# Patient Record
Sex: FEMALE | Race: WHITE | NOT HISPANIC OR LATINO | Employment: UNEMPLOYED | URBAN - METROPOLITAN AREA
[De-identification: names, ages, dates, MRNs, and addresses within clinical notes are randomized per-mention and may not be internally consistent; named-entity substitution may affect disease eponyms.]

---

## 2017-02-21 ENCOUNTER — TRANSFERRED RECORDS (OUTPATIENT)
Dept: HEALTH INFORMATION MANAGEMENT | Facility: CLINIC | Age: 52
End: 2017-02-21

## 2017-02-22 ENCOUNTER — TRANSFERRED RECORDS (OUTPATIENT)
Dept: HEALTH INFORMATION MANAGEMENT | Facility: CLINIC | Age: 52
End: 2017-02-22

## 2017-05-31 ENCOUNTER — HOSPITAL ENCOUNTER (EMERGENCY)
Facility: CLINIC | Age: 52
Discharge: HOME | End: 2017-05-31
Attending: FAMILY MEDICINE | Admitting: FAMILY MEDICINE
Payer: MEDICAID

## 2017-05-31 VITALS
RESPIRATION RATE: 16 BRPM | WEIGHT: 249 LBS | TEMPERATURE: 98.1 F | OXYGEN SATURATION: 100 % | HEART RATE: 83 BPM | SYSTOLIC BLOOD PRESSURE: 126 MMHG | DIASTOLIC BLOOD PRESSURE: 83 MMHG

## 2017-05-31 DIAGNOSIS — D50.8 OTHER IRON DEFICIENCY ANEMIA: ICD-10-CM

## 2017-05-31 LAB
ALBUMIN SERPL-MCNC: 3.7 G/DL (ref 3.4–5)
ALP SERPL-CCNC: 86 U/L (ref 40–150)
ALT SERPL W P-5'-P-CCNC: 15 U/L (ref 0–50)
ANION GAP SERPL CALCULATED.3IONS-SCNC: 8 MMOL/L (ref 3–14)
AST SERPL W P-5'-P-CCNC: 16 U/L (ref 0–45)
BASOPHILS # BLD AUTO: 0 10E9/L (ref 0–0.2)
BASOPHILS NFR BLD AUTO: 0.3 %
BILIRUB SERPL-MCNC: 0.2 MG/DL (ref 0.2–1.3)
BUN SERPL-MCNC: 19 MG/DL (ref 7–30)
CALCIUM SERPL-MCNC: 9 MG/DL (ref 8.5–10.1)
CHLORIDE SERPL-SCNC: 110 MMOL/L (ref 94–109)
CO2 SERPL-SCNC: 25 MMOL/L (ref 20–32)
CREAT SERPL-MCNC: 1.06 MG/DL (ref 0.52–1.04)
DIFFERENTIAL METHOD BLD: ABNORMAL
EOSINOPHIL # BLD AUTO: 0.6 10E9/L (ref 0–0.7)
EOSINOPHIL NFR BLD AUTO: 6.4 %
ERYTHROCYTE [DISTWIDTH] IN BLOOD BY AUTOMATED COUNT: 15.9 % (ref 10–15)
GFR SERPL CREATININE-BSD FRML MDRD: 54 ML/MIN/1.7M2
GLUCOSE SERPL-MCNC: 85 MG/DL (ref 70–99)
HCT VFR BLD AUTO: 27.9 % (ref 35–47)
HGB BLD-MCNC: 7.5 G/DL (ref 11.7–15.7)
IMM GRANULOCYTES # BLD: 0 10E9/L (ref 0–0.4)
IMM GRANULOCYTES NFR BLD: 0.2 %
LYMPHOCYTES # BLD AUTO: 2.6 10E9/L (ref 0.8–5.3)
LYMPHOCYTES NFR BLD AUTO: 26.9 %
MCH RBC QN AUTO: 19 PG (ref 26.5–33)
MCHC RBC AUTO-ENTMCNC: 26.9 G/DL (ref 31.5–36.5)
MCV RBC AUTO: 71 FL (ref 78–100)
MONOCYTES # BLD AUTO: 0.6 10E9/L (ref 0–1.3)
MONOCYTES NFR BLD AUTO: 5.9 %
NEUTROPHILS # BLD AUTO: 5.9 10E9/L (ref 1.6–8.3)
NEUTROPHILS NFR BLD AUTO: 60.3 %
NRBC # BLD AUTO: 0 10*3/UL
NRBC BLD AUTO-RTO: 0 /100
PLATELET # BLD AUTO: 332 10E9/L (ref 150–450)
POTASSIUM SERPL-SCNC: 4.1 MMOL/L (ref 3.4–5.3)
PROT SERPL-MCNC: 7.2 G/DL (ref 6.8–8.8)
RBC # BLD AUTO: 3.94 10E12/L (ref 3.8–5.2)
SODIUM SERPL-SCNC: 143 MMOL/L (ref 133–144)
WBC # BLD AUTO: 9.7 10E9/L (ref 4–11)

## 2017-05-31 PROCEDURE — 36415 COLL VENOUS BLD VENIPUNCTURE: CPT

## 2017-05-31 PROCEDURE — 85025 COMPLETE CBC W/AUTO DIFF WBC: CPT | Performed by: FAMILY MEDICINE

## 2017-05-31 PROCEDURE — 80053 COMPREHEN METABOLIC PANEL: CPT | Performed by: FAMILY MEDICINE

## 2017-05-31 PROCEDURE — 99283 EMERGENCY DEPT VISIT LOW MDM: CPT | Mod: Z6 | Performed by: FAMILY MEDICINE

## 2017-05-31 PROCEDURE — 99283 EMERGENCY DEPT VISIT LOW MDM: CPT

## 2017-05-31 RX ORDER — POLYETHYLENE GLYCOL 3350 17 G/17G
1 POWDER, FOR SOLUTION ORAL DAILY
COMMUNITY

## 2017-05-31 ASSESSMENT — ENCOUNTER SYMPTOMS
ABDOMINAL PAIN: 1
BLOOD IN STOOL: 1
FATIGUE: 1

## 2017-05-31 NOTE — ED AVS SNAPSHOT
[facility], Emergency Department    [address]    [facility] [address]    Phone:  [phone]    Fax:  [phone]                                       Tatiana Smith   MRN: [id number]    Department:  [facility], Emergency Department   Date of Visit:  5/31/2017           After Visit Summary Signature Page     I have received my discharge instructions, and my questions have been answered. I have discussed any challenges I see with this plan with the nurse or doctor.    ..........................................................................................................................................  Patient/Patient Representative Signature      ..........................................................................................................................................  Patient Representative Print Name and Relationship to Patient    ..................................................               ................................................  Date                                            Time    ..........................................................................................................................................  Reviewed by Signature/Title    ...................................................              ..............................................  Date                                                            Time

## 2017-05-31 NOTE — ED AVS SNAPSHOT
[facility], Emergency Department    [address]    [place] [address]    Phone:  [phone]    Fax:  [phone]                                       Tatiana Smith   MRN: [id number]    Department:  [facility], Emergency Department   Date of Visit:  5/31/2017           Patient Information     Date Of Birth          1965        Your diagnoses for this visit were:     Other iron deficiency anemia        You were seen by Wolfgang Hale MD.        Discharge Instructions       Follow your clinician next week.  There is no evidence for a gi bleed  Continue the prilosec.  It is likely the anemia is from the surgery and you are not absorbing iron  Your red cells are normal in number but very small- this is the picture of iron deficiency    24 Hour Appointment Hotline       To make an appointment at any [facility], call [phone] ([phone]). If you don't have a family doctor or clinic, we will help you find one. [place] clinics are conveniently located to serve the needs of you and your family.             Review of your medicines      Our records show that you are taking the medicines listed below. If these are incorrect, please call your family doctor or clinic.        Dose / Directions Last dose taken    FERROUS GLUCONATE PO   Dose:  324 mg        Take 324 mg by mouth   Refills:  0        NONFORMULARY        Bio-dentical progesterone/testosterone cream   Refills:  0        PANTOPRAZOLE SODIUM PO        Refills:  0        polyethylene glycol powder   Commonly known as:  MIRALAX/GLYCOLAX   Dose:  1 capful        Take 1 capful by mouth daily   Refills:  0        ZOMIG PO        Refills:  0                Procedures and tests performed during your visit     CBC with platelets differential    Comprehensive metabolic panel      Orders Needing Specimen Collection     None      Pending Results     No orders found from 5/29/2017 to 6/1/2017.            Pending Culture Results     No orders  "found from 2017 to 2017.            Pending Results Instructions     If you had any lab results that were not finalized at the time of your Discharge, you can call the ED Lab Result RN at [phone]. You will be contacted by this team for any positive Lab results or changes in treatment. The nurses are available 7 days a week from 10A to 6:30P.  You can leave a message 24 hours per day and they will return your call.        Thank you for choosing [facility]       Thank you for choosing [facility] for your care. Our goal is always to provide you with excellent care. Hearing back from our patients is one way we can continue to improve our services. Please take a few minutes to complete the written survey that you may receive in the mail after you visit with us. Thank you!        [email or web address] Information     [email or web address] lets you send messages to your doctor, view your test results, renew your prescriptions, schedule appointments and more. To sign up, go to [email or web address] . Click on \"Log in\" on the left side of the screen, which will take you to the Welcome page. Then click on \"Sign up Now\" on the right side of the page.     You will be asked to enter the access code listed below, as well as some personal information. Please follow the directions to create your username and password.     Your access code is: [email or web address]  Expires: 2017 11:04 PM     Your access code will  in 90 days. If you need help or a new code, please call your [facility] clinic or [phone].        Care EveryWhere ID     This is your Care EveryWhere ID. This could be used by other organizations to access your [facility] medical records  [id number]        After Visit Summary       This is your record. Keep this with you and show to your community pharmacist(s) and doctor(s) at your next visit.                  "

## 2017-06-01 NOTE — ED PROVIDER NOTES
History     Chief Complaint   Patient presents with     Anemia     sent to ED from [facility] clinic for Hgb 8.2->7.5 in the past week; suspected bleeding ulcer. Hx hospitalizations for GI bleed/low hemoglobin     HPI  Tatiana Smith is a 52 year old female with a history of Shaina-en-Y gastric bypass (1986), gastric ulcers and anemia who presents to the emergency department today with complaints of anemia. Patient was seen at the [facility] clinic today and was noted to have a low hemoglobin of 7.5 (down from 8.2 last week). Patient states she has noted increased fatigue, paler and shortness of breath with exertion over the past few weeks. She also reports noting black colored stool, however, she is on iron supplements. She reports intermittent abdominal pain and has been on Prilosec for the past 3-4 weeks. She states her last menstrual period was about 2 weeks ago and was normal. She denies any other obvious signs of bleeding. Patient reports a history of anemia in the past related to gastric ulcers. She was most recently hospitalized in February (~4 months ago) with a hemoglobin of 6.6 in [place], [place]. She reported having had a normal colonoscopy at that time and her anemia was thought to be related to ulcers. She has required transfusion in the past. Patient is also takes B12 sublingually, states she last had this about 6 months ago. She denies taking any other regular medications. She denies any excessive use of ibuprofen. She denies any alcohol or caffeine use. She is a nonsmoker.    She was sent to the ed for further evaluation. AT THE CLINIC NO RECTAL OR GUIAC DONE! According to the pt    I have reviewed the Medications, Allergies, Past Medical and Surgical History, and Social History in the [employer] system.    Past Medical History:   Diagnosis Date     Anemia      H/O gastric bypass 1986     Ulcer, gastric, acute        Past Surgical History:   Procedure Laterality Date     GI SURGERY         No family history on  file.    Social History   Substance Use Topics     Smoking status: Never Smoker     Smokeless tobacco: Not on file     Alcohol use No     No current facility-administered medications for this encounter.      Current Outpatient Prescriptions   Medication     FERROUS GLUCONATE PO     PANTOPRAZOLE SODIUM PO     polyethylene glycol (MIRALAX/GLYCOLAX) powder     ZOLMitriptan (ZOMIG PO)     NONFORMULARY      No Known Allergies    Review of Systems   Constitutional: Positive for fatigue. Negative for chills and fever.   HENT: Negative for congestion.    Respiratory: Positive for shortness of breath (with exertion).    Cardiovascular: Negative for chest pain, palpitations and leg swelling.   Gastrointestinal: Positive for abdominal pain (intermittent) and blood in stool (black colored stool).   Genitourinary: Negative for dysuria and vaginal bleeding.   Skin: Positive for pallor.   Allergic/Immunologic: Negative for immunocompromised state.   Neurological: Negative for dizziness and light-headedness.   Hematological: Negative.    All other systems reviewed and are negative.      Physical Exam   BP: 127/73  Pulse: 87  Temp: 98.7  F (37.1  C)  Resp: 16  Weight: 112.9 kg (249 lb)  SpO2: 97 %  Physical Exam   Constitutional: She is oriented to person, place, and time. She appears well-developed and well-nourished. No distress.   pale   HENT:   Head: Normocephalic and atraumatic.   Eyes: Pupils are equal, round, and reactive to light.   Neck: Neck supple.   Cardiovascular: Normal rate, regular rhythm, normal heart sounds and intact distal pulses.    No murmur heard.  Pulmonary/Chest: Breath sounds normal. No respiratory distress.   Abdominal: Soft. She exhibits no mass. There is no tenderness.   No hs aretha   Genitourinary:   Genitourinary Comments: Rectal exam shows no lesions- small hemorrhoids  Stool guiac is negative for blood   Neurological: She is alert and oriented to person, place, and time.   Skin: Skin is warm and dry.  She is not diaphoretic.   Nursing note and vitals reviewed.      ED Course     ED Course     Procedures   8:18 PM  The patient was seen and examined by Dr. Hale in Room ED19.             Labs Ordered and Resulted from Time of ED Arrival Up to the Time of Departure from the ED   CBC WITH PLATELETS DIFFERENTIAL - Abnormal; Notable for the following:        Result Value    Hemoglobin 7.5 (*)     Hematocrit 27.9 (*)     MCV 71 (*)     MCH 19.0 (*)     MCHC 26.9 (*)     RDW 15.9 (*)     All other components within normal limits   COMPREHENSIVE METABOLIC PANEL - Abnormal; Notable for the following:     Chloride 110 (*)     Creatinine 1.06 (*)     GFR Estimate 54 (*)     All other components within normal limits            Assessments & Plan (with Medical Decision Making)   Well tolerated microcytic anemia with hx of weight reduction surgery.  This is likely an iron def anemia. There is no evidence for acute bleeding!  Follow up needed.    I have reviewed the nursing notes.    I have reviewed the findings, diagnosis, plan and need for follow up with the patient.    Discharge Medication List as of 5/31/2017 11:05 PM          Final diagnoses:   Other iron deficiency anemia   IKailyn, am serving as a trained medical scribe to document services personally performed by Wolfgang Hale MD, based on the provider's statements to me.      Wolfgang FUENTES MD, was physically present and have reviewed and verified the accuracy of this note documented by Kailyn Freire.       5/31/2017   [facility] EMERGENCY DEPARTMENT     Wolfgang Hale MD  06/07/17 5034

## 2017-06-01 NOTE — DISCHARGE INSTRUCTIONS
Follow your clinician next week.  There is no evidence for a gi bleed  Continue the prilosec.  It is likely the anemia is from the surgery and you are not absorbing iron  Your red cells are normal in number but very small- this is the picture of iron deficiency

## 2017-06-07 ASSESSMENT — ENCOUNTER SYMPTOMS
SHORTNESS OF BREATH: 1
DYSURIA: 0
DIZZINESS: 0
HEMATOLOGIC/LYMPHATIC NEGATIVE: 1
CHILLS: 0
PALPITATIONS: 0
FEVER: 0
LIGHT-HEADEDNESS: 0

## 2017-06-28 ENCOUNTER — MEDICAL CORRESPONDENCE (OUTPATIENT)
Dept: HEALTH INFORMATION MANAGEMENT | Facility: CLINIC | Age: 52
End: 2017-06-28

## 2017-10-01 ENCOUNTER — HEALTH MAINTENANCE LETTER (OUTPATIENT)
Age: 52
End: 2017-10-01

## 2018-07-25 ENCOUNTER — TRANSFERRED RECORDS (OUTPATIENT)
Dept: HEALTH INFORMATION MANAGEMENT | Facility: CLINIC | Age: 53
End: 2018-07-25

## 2018-07-25 ENCOUNTER — MEDICAL CORRESPONDENCE (OUTPATIENT)
Dept: HEALTH INFORMATION MANAGEMENT | Facility: CLINIC | Age: 53
End: 2018-07-25

## 2018-07-27 ENCOUNTER — TELEPHONE (OUTPATIENT)
Dept: GASTROENTEROLOGY | Facility: CLINIC | Age: 53
End: 2018-07-27

## 2018-07-27 ENCOUNTER — TRANSFERRED RECORDS (OUTPATIENT)
Dept: HEALTH INFORMATION MANAGEMENT | Facility: CLINIC | Age: 53
End: 2018-07-27

## 2018-07-27 NOTE — TELEPHONE ENCOUNTER
Spoke to Melissa from [facility] medical records in regards to referral received. Informed melissa we need all pertinent medical records relating to issue of what the patient is being referred for. Gave fax number where records can be sent to.

## 2018-08-24 ENCOUNTER — TELEPHONE (OUTPATIENT)
Dept: GASTROENTEROLOGY | Facility: CLINIC | Age: 53
End: 2018-08-24

## 2018-08-24 NOTE — TELEPHONE ENCOUNTER
LVM for patient in regards to message received from call center. Asked patient to return phone call to clarify referral. Left call back number for patient to call back.

## 2018-08-24 NOTE — TELEPHONE ENCOUNTER
Health Call Center    Phone Message    May a detailed message be left on voicemail: yes    Reason for Call: Other: Pt calling to check on status of referral. She states she called last week and had been told the clinic needed more information on her dx before she could be seen. She states she had contacted her provider's office (Dr. Linda Sarkar at [facility]), and wanted to see if clinic had received this. Please f/u with pt if information received or not.     Action Taken: Message routed to:  [facility] Gastro Adult

## 2018-08-27 NOTE — TELEPHONE ENCOUNTER
[facility]    Phone Message    May a detailed message be left on voicemail: yes    Reason for Call: Other: A nurse from the [facility] is calling to find out the status of her referral to GI. The pt told her we were waiting on something but she hasn't rcvd any requests for info. She is frustrated as the referral cxame in about a month ago. Please call the clinic at [phone] to discuss with any nurse as to what is needed to proceed.      Action Taken: Message routed to:  [facility] brian gi

## 2018-08-28 ENCOUNTER — TELEPHONE (OUTPATIENT)
Dept: GASTROENTEROLOGY | Facility: CLINIC | Age: 53
End: 2018-08-28

## 2018-08-28 NOTE — TELEPHONE ENCOUNTER
LVM for patient in regards to scheduling appointment in GI clinic. Left call back number for patient to call and schedule appointment.

## 2018-11-02 ENCOUNTER — TELEPHONE (OUTPATIENT)
Dept: GASTROENTEROLOGY | Facility: CLINIC | Age: 53
End: 2018-11-02

## 2018-11-02 NOTE — TELEPHONE ENCOUNTER
FUTURE VISIT INFORMATION      FUTURE VISIT INFORMATION:    Date: 11/5/18     Time:     Location: [facility]  REFERRAL INFORMATION:    Referring provider:  Dr. Jean Carlos Sarkar    Referring providers clinic:  [facility]    Reason for visit/diagnosis: Gastric Ulcer - All records received

## 2018-11-05 ENCOUNTER — PRE VISIT (OUTPATIENT)
Dept: GASTROENTEROLOGY | Facility: CLINIC | Age: 53
End: 2018-11-05

## 2018-11-05 ENCOUNTER — OFFICE VISIT (OUTPATIENT)
Dept: GASTROENTEROLOGY | Facility: CLINIC | Age: 53
End: 2018-11-05
Payer: COMMERCIAL

## 2018-11-05 VITALS
BODY MASS INDEX: 35.7 KG/M2 | OXYGEN SATURATION: 96 % | HEIGHT: 71 IN | DIASTOLIC BLOOD PRESSURE: 78 MMHG | HEART RATE: 80 BPM | SYSTOLIC BLOOD PRESSURE: 130 MMHG | WEIGHT: 255 LBS

## 2018-11-05 DIAGNOSIS — Z98.84 S/P GASTRIC BYPASS: ICD-10-CM

## 2018-11-05 DIAGNOSIS — D50.0 IRON DEFICIENCY ANEMIA DUE TO CHRONIC BLOOD LOSS: ICD-10-CM

## 2018-11-05 DIAGNOSIS — D50.0 IRON DEFICIENCY ANEMIA DUE TO CHRONIC BLOOD LOSS: Primary | ICD-10-CM

## 2018-11-05 LAB
BASOPHILS # BLD AUTO: 0.1 10E9/L (ref 0–0.2)
BASOPHILS NFR BLD AUTO: 0.7 %
DIFFERENTIAL METHOD BLD: ABNORMAL
EOSINOPHIL # BLD AUTO: 0.5 10E9/L (ref 0–0.7)
EOSINOPHIL NFR BLD AUTO: 7.1 %
ERYTHROCYTE [DISTWIDTH] IN BLOOD BY AUTOMATED COUNT: 16.6 % (ref 10–15)
FERRITIN SERPL-MCNC: 4 NG/ML (ref 8–252)
FOLATE SERPL-MCNC: 35.3 NG/ML
HCT VFR BLD AUTO: 31.7 % (ref 35–47)
HGB BLD-MCNC: 8.3 G/DL (ref 11.7–15.7)
IMM GRANULOCYTES # BLD: 0 10E9/L (ref 0–0.4)
IMM GRANULOCYTES NFR BLD: 0.3 %
IRON SATN MFR SERPL: 2 % (ref 15–46)
IRON SERPL-MCNC: 12 UG/DL (ref 35–180)
LYMPHOCYTES # BLD AUTO: 1.3 10E9/L (ref 0.8–5.3)
LYMPHOCYTES NFR BLD AUTO: 17.5 %
MCH RBC QN AUTO: 18.6 PG (ref 26.5–33)
MCHC RBC AUTO-ENTMCNC: 26.2 G/DL (ref 31.5–36.5)
MCV RBC AUTO: 71 FL (ref 78–100)
MONOCYTES # BLD AUTO: 0.5 10E9/L (ref 0–1.3)
MONOCYTES NFR BLD AUTO: 7.2 %
NEUTROPHILS # BLD AUTO: 4.8 10E9/L (ref 1.6–8.3)
NEUTROPHILS NFR BLD AUTO: 67.2 %
NRBC # BLD AUTO: 0 10*3/UL
NRBC BLD AUTO-RTO: 0 /100
PLATELET # BLD AUTO: 302 10E9/L (ref 150–450)
RBC # BLD AUTO: 4.46 10E12/L (ref 3.8–5.2)
RETICS # AUTO: 63.8 10E9/L (ref 25–95)
RETICS/RBC NFR AUTO: 1.4 % (ref 0.5–2)
TIBC SERPL-MCNC: 480 UG/DL (ref 240–430)
VIT B12 SERPL-MCNC: 1033 PG/ML (ref 193–986)
WBC # BLD AUTO: 7.2 10E9/L (ref 4–11)

## 2018-11-05 PROCEDURE — 83516 IMMUNOASSAY NONANTIBODY: CPT | Performed by: INTERNAL MEDICINE

## 2018-11-05 PROCEDURE — 82746 ASSAY OF FOLIC ACID SERUM: CPT | Performed by: INTERNAL MEDICINE

## 2018-11-05 RX ORDER — PANTOPRAZOLE SODIUM 40 MG/1
40 TABLET, DELAYED RELEASE ORAL DAILY
Qty: 60 TABLET | Refills: 1 | Status: SHIPPED | OUTPATIENT
Start: 2018-11-05 | End: 2019-02-25

## 2018-11-05 ASSESSMENT — ENCOUNTER SYMPTOMS
SWOLLEN GLANDS: 0
INCREASED ENERGY: 1
ABDOMINAL PAIN: 0
DIARRHEA: 0
TASTE DISTURBANCE: 0
ALTERED TEMPERATURE REGULATION: 1
POSTURAL DYSPNEA: 0
CHILLS: 0
EYE REDNESS: 0
EYE WATERING: 0
BACK PAIN: 0
POLYPHAGIA: 0
SNORES LOUDLY: 0
BLOOD IN STOOL: 1
WEIGHT GAIN: 0
BLOATING: 1
WEIGHT LOSS: 0
SHORTNESS OF BREATH: 0
HOARSE VOICE: 0
HALLUCINATIONS: 0
TROUBLE SWALLOWING: 1
POLYDIPSIA: 0
BRUISES/BLEEDS EASILY: 0
NAUSEA: 0
EYE PAIN: 0
BOWEL INCONTINENCE: 0
MYALGIAS: 1
SINUS PAIN: 0
RECTAL PAIN: 0
WHEEZING: 0
SINUS CONGESTION: 0
FEVER: 0
EYE IRRITATION: 0
FATIGUE: 1
DOUBLE VISION: 1
VOMITING: 0
JAUNDICE: 0
CONSTIPATION: 1
ARTHRALGIAS: 0
SORE THROAT: 0
SPUTUM PRODUCTION: 0
COUGH DISTURBING SLEEP: 0
NECK PAIN: 0
MUSCLE WEAKNESS: 0
NIGHT SWEATS: 0
NECK MASS: 0
MUSCLE CRAMPS: 0
HEARTBURN: 1
STIFFNESS: 0
DECREASED APPETITE: 0
HEMOPTYSIS: 0
SMELL DISTURBANCE: 0
DYSPNEA ON EXERTION: 1
JOINT SWELLING: 0
COUGH: 0

## 2018-11-05 ASSESSMENT — PAIN SCALES - GENERAL: PAINLEVEL: NO PAIN (0)

## 2018-11-05 NOTE — LETTER
11/5/2018       RE: Tatiana Smith  [address]  [facility] [address]     Dear Colleague,    Thank you for referring your patient, Tatiana Smith, to the [facility] GASTROENTEROLOGY AND IBD CLINIC at [facility]. Please see a copy of my visit note below.    GI CLINIC VISIT - NEW PATIENT    CC/REFERRING PROVIDER: Jean Carlos Sarkar  REASON FOR CONSULTATION: iron deficiency, history of marginal ulcers    HPI: 53 year old female with PMH of gastric bypass (Shaina en Y) in 1986, marginal ulcer on EGD 2/2016 with chronic iron deficiency anemia, who presents for further evaluation of iron deficiency anemia marginal ulcers.     Initially had melena in 2/2016 when in [place] (there frequently to help her daughter who is in nursing school and went through a divorce) with Hgb of 5.3. EGD at that time revealed clean based marginal ulcer. She was recommended to discontinue NSAIDs and take PPI and PO iron. In 7/2017, she had BRBPR so underwent colonoscopy in [place] which revealed internal/external hemorrhoids. Hgb was 6.6 at that time. She subsequently [facility] up [facility] 7/2018 with Hgb in the 8s and MCV in high 60s-70s, and then in [place] with internal medicine last month with Hgb 8.7 (was 9.0) one month prior. She continues on oral iron, taking 1-2 tabs per day. She has taken PPI off and on, most recently daily for one month ending 2 weeks ago. She uses NSAIDs, ibuprofen 2x/week for aches in her legs.     She denies any recent black stool. She notes BRBPR every 2 weeks which is small amount and sometimes only noted on toilet paper. Normally has one formed BM every other day. Notes reduced exercise tolerance and easy fatiguing with anemia. No abdominal pain, N/V, dys/odynophagia. Weight stable. No fevers/chills.     No other sources of bleeding - perimenopausal, so one period every 2-3 months, not heavy. No nosebleeds. No easy brusing.     ROS: 10pt ROS performed and otherwise  "negative.    PERTINENT PAST MEDICAL HISTORY:  As noted above.    PREVIOUS ABDOMINAL/GYNECOLOGIC SURGERIES:  Gastric bypass 1986    PREVIOUS ENDOSCOPY:  EGD 2/2016 as reviewed above  Colon 2/2017 as reviewed above    PERTINENT MEDICATIONS:  Vit C  Iron   MVI  Miralax 1-2 x/day  Progesterone/testosteron cream  Ibuprofen 2x/week  Medications reviewed with patient today, see Medication List/Assessment for details.  No other OTC/herbal/supplements reported by patient.    SOCIAL HISTORY:  Lives in [place]  No smoking, EtOH, or drugs    FAMILY HISTORY:  IBS - Mom and sister  No FH of colon cancer or IBD     PHYSICAL EXAMINATION:  Vitals reviewed  /78  Pulse 80  Ht 1.803 m (5' 11\")  Wt 115.7 kg (255 lb)  LMP  (LMP Unknown)  SpO2 96%  BMI 35.57 kg/m2    Gen: aaox3, cooperative, pleasant, not diaphoretic, nad  HEENT: ncat, neck supple, no clad/sclad, normal op w/o ulcer/exudate, anicteric, mmm  Resp/CV without acute findings, not dyspneic/tachycardic  Abd: Soft, non-tender, non-distended, bowel sounds present, healed midline incision scar  Ext: no c/c/e  Skin: warm, perfused, no jaundice  Neuro: grossly intact    PERTINENT STUDIES Reviewed in EMR    ASSESSMENT/PLAN: 53 year old female with PMH of gastric bypass (Shaina en Y) in 1986, marginal ulcer on EGD 2/2016 with chronic iron deficiency anemia, who presents for further evaluation of iron deficiency anemia and marginal ulcers. She has ongoing anemia per her report without overt evidence of GI bleeding - suspect this may be a combination of reduced absorption with bypassed duodenum as well as occult blood loss from marginal ulcer in setting of ongoing NSAID use. She does not smoke or have other clear sources of blood loss.   - Repeat EGD to evaluate for ongoing ulcers and other sources of blood loss, and repeat colonoscopy to evaluate for other sources of blood loss  - Recheck iron studies, retics, CBC - start IV iron replacement if still iron deficient  - Continue PO " iron  - Complete NSAID avoidance  - Check B12/Folate, B vitamins given history of gastric bypass. Refer to nutrition to ensure adequate supplementation in setting of gastric bypass  - Check TTG to evaluate for celiac  - Restart daily PPI   - Consider MRE to evaluate small bowel if upper and lower endoscopy are negative    RTC 3 months, sooner if symptomatic.     Thank you for this consultation. It was a pleasure to participate in the care of this patient; please contact us with any further questions.    Discussed with Dr. Beau Tang MD  GI Fellow  p [phone]      ATTENDING ATTESTATION:     DATE SEEN: 11/5/2018    Patient was discussed, seen, and examined by me, Bishop Yanez. The plan of care and pertinent data/imaging were also reviewed with the GI Fellow, Dr. Tang. Agree with the assessment and plan as delineated above with the following additions:     Iron deficiency is likely due to a combination of bypass surgery (iron is mainly absorbed in the duodenum and the duodenum is bypassed due to her surgery) and marginal ulcer in the setting of chronic NSAID use. Recommended abstaining from NSAIDs. Proceed with EGD/colonoscopy. Agree with lab evaluation.    Please contact me with any further questions.    Bishop Yanez MD    [facility]  Division of Gastroenterology, Hepatology and Nutrition

## 2018-11-05 NOTE — PATIENT INSTRUCTIONS
- Take pantoprazole once daily  Please take at least 30 minutes before you eat        - Continue oral iron (ferrous sulfate)      - Start IV iron infusions  Please call  option 7 then option 2    - Scheduled EGD/Colonoscopy  You are scheduled on November 21   Check in time 2pm  [facility]  [address] [facility]   You will be mailed the instructions  One week prior to exam you will receive a pre assessment nurse    - See nutritionist  Keep a food and symptom diary  You are scheduled on       - No NSAIDs (ibuprofen), take Tylenol instead  Avoid Aleve also \      - We will let you know about blood work results    - Follow-up with us in 3 months      For questions regarding your care Monday through Friday, contact the RN GI care coordinator,  Call   [phone] . Your call will be  returned same day, or if consultation is needed with the provider, it may be following business day - or you may send a My Chart message.    For medication refills (prescribed by the GI clinic), contact your pharmacy.    For appointment rescheduling/cancellation, contact [phone]     After hours, or if you have an immediate GI concern and cannot wait for a return call, contact the GI Fellow at [phone] and select option #4.     Thanks Kristan Graves RN Care Coordinator for Dr. Yanez   Phone   [phone]

## 2018-11-05 NOTE — PROGRESS NOTES
GI CLINIC VISIT - NEW PATIENT    CC/REFERRING PROVIDER: Jean Carlos Sarkar  REASON FOR CONSULTATION: iron deficiency, history of marginal ulcers    HPI: 53 year old female with PMH of gastric bypass (Shaina en Y) in 1986, marginal ulcer on EGD 2/2016 with chronic iron deficiency anemia, who presents for further evaluation of iron deficiency anemia marginal ulcers.     Initially had melena in 2/2016 when in [place] (there frequently to help her daughter who is in nursing school and went through a divorce) with Hgb of 5.3. EGD at that time revealed clean based marginal ulcer. She was recommended to discontinue NSAIDs and take PPI and PO iron. In 7/2017, she had BRBPR so underwent colonoscopy in [place] which revealed internal/external hemorrhoids. Hgb was 6.6 at that time. She subsequently followed up in [facility] clinic 7/2018 with Hgb in the 8s and MCV in high 60s-70s, and then in [place] with internal medicine last month with Hgb 8.7 (was 9.0) one month prior. She continues on oral iron, taking 1-2 tabs per day. She has taken PPI off and on, most recently daily for one month ending 2 weeks ago. She uses NSAIDs, ibuprofen 2x/week for aches in her legs.     She denies any recent black stool. She notes BRBPR every 2 weeks which is small amount and sometimes only noted on toilet paper. Normally has one formed BM every other day. Notes reduced exercise tolerance and easy fatiguing with anemia. No abdominal pain, N/V, dys/odynophagia. Weight stable. No fevers/chills.     No other sources of bleeding - perimenopausal, so one period every 2-3 months, not heavy. No nosebleeds. No easy brusing.     ROS: 10pt ROS performed and otherwise negative.    PERTINENT PAST MEDICAL HISTORY:  As noted above.    PREVIOUS ABDOMINAL/GYNECOLOGIC SURGERIES:  Gastric bypass 1986    PREVIOUS ENDOSCOPY:  EGD 2/2016 as reviewed above  Colon 2/2017 as reviewed above    PERTINENT MEDICATIONS:  Vit C  Iron   MVI  Miralax 1-2 x/day  Progesterone/testosteron  "cream  Ibuprofen 2x/week  Medications reviewed with patient today, see Medication List/Assessment for details.  No other OTC/herbal/supplements reported by patient.    SOCIAL HISTORY:  Lives in [place]  No smoking, EtOH, or drugs    FAMILY HISTORY:  IBS - Mom and sister  No FH of colon cancer or IBD     PHYSICAL EXAMINATION:  Vitals reviewed  /78  Pulse 80  Ht 1.803 m (5' 11\")  Wt 115.7 kg (255 lb)  LMP  (LMP Unknown)  SpO2 96%  BMI 35.57 kg/m2    Gen: aaox3, cooperative, pleasant, not diaphoretic, nad  HEENT: ncat, neck supple, no clad/sclad, normal op w/o ulcer/exudate, anicteric, mmm  Resp/CV without acute findings, not dyspneic/tachycardic  Abd: Soft, non-tender, non-distended, bowel sounds present, healed midline incision scar  Ext: no c/c/e  Skin: warm, perfused, no jaundice  Neuro: grossly intact    PERTINENT STUDIES Reviewed in EMR    ASSESSMENT/PLAN: 53 year old female with PMH of gastric bypass (Shaina en Y) in 1986, marginal ulcer on EGD 2/2016 with chronic iron deficiency anemia, who presents for further evaluation of iron deficiency anemia and marginal ulcers. She has ongoing anemia per her report without overt evidence of GI bleeding - suspect this may be a combination of reduced absorption with bypassed duodenum as well as occult blood loss from marginal ulcer in setting of ongoing NSAID use. She does not smoke or have other clear sources of blood loss.   - Repeat EGD to evaluate for ongoing ulcers and other sources of blood loss, and repeat colonoscopy to evaluate for other sources of blood loss  - Recheck iron studies, retics, CBC - start IV iron replacement if still iron deficient  - Continue PO iron  - Complete NSAID avoidance  - Check B12/Folate, B vitamins given history of gastric bypass. Refer to nutrition to ensure adequate supplementation in setting of gastric bypass  - Check TTG to evaluate for celiac  - Restart daily PPI   - Consider MRE to evaluate small bowel if upper and lower " endoscopy are negative    RTC 3 months, sooner if symptomatic.     Thank you for this consultation. It was a pleasure to participate in the care of this patient; please contact us with any further questions.    Discussed with Dr. Beau Tang MD  GI Fellow  p [phone]      ATTENDING ATTESTATION:     DATE SEEN: 11/5/2018    Patient was discussed, seen, and examined by me, Bishop Yanez. The plan of care and pertinent data/imaging were also reviewed with the GI Fellow, Dr. Tang. Agree with the assessment and plan as delineated above with the following additions:     Iron deficiency is likely due to a combination of bypass surgery (iron is mainly absorbed in the duodenum and the duodenum is bypassed due to her surgery) and marginal ulcer in the setting of chronic NSAID use. Recommended abstaining from NSAIDs. Proceed with EGD/colonoscopy. Agree with lab evaluation.    Please contact me with any further questions.    Bishop Yanez MD    [facility]  Division of Gastroenterology, Hepatology and Nutrition

## 2018-11-05 NOTE — NURSING NOTE
"Chief Complaint   Patient presents with     Consult     NEW - Gastic Ulcer       Vitals:    11/05/18 0755   BP: 130/78   Pulse: 80   SpO2: 96%   Height: 5' 11\"       Body mass index is 34.73 kg/(m^2).      Bryce Esquivel on 11/5/2018 at 7:59 AM                          "

## 2018-11-05 NOTE — NURSING NOTE
Printed after visit summary given to pt.  injectafer therapy plan entered. Pt will call to schedule.   In basket to set up follow up with Ms. Waters.  Pt sent to the lab. egd and colonoscopy scheduled.  Nurtion  appt scheduled. Pt will check to make sure covered.

## 2018-11-05 NOTE — MR AVS SNAPSHOT
After Visit Summary   11/5/2018    Tatiana Smith    MRN: [id number]           Patient Information     Date Of Birth          1965        Visit Information        Provider Department      11/5/2018 8:00 AM Rubén Tang MD [facility]        Today's Diagnoses     Iron deficiency anemia due to chronic blood loss    -  1      Care Instructions    - Take pantoprazole once daily  Please take at least 30 minutes before you eat        - Continue oral iron (ferrous sulfate)      - Start IV iron infusions  Please call  option 7 then option 2    - Scheduled EGD/Colonoscopy  You are scheduled on November 21   Check in time 2pm  [place] [facility]  [facility]   You will be mailed the instructions  One week prior to exam you will receive a pre assessment nurse    - See nutritionist  Keep a food and symptom diary  You are scheduled on       - No NSAIDs (ibuprofen), take Tylenol instead  Avoid Aleve also \      - We will let you know about blood work results    - Follow-up with us in 3 months      For questions regarding your care Monday through Friday, contact the RN GI care coordinator,  Call   [phone] . Your call will be  returned same day, or if consultation is needed with the provider, it may be following business day - or you may send a Cloudbot Chart message.    For medication refills (prescribed by the GI clinic), contact your pharmacy.    For appointment rescheduling/cancellation, contact [phone]     After hours, or if you have an immediate GI concern and cannot wait for a return call, contact the GI Fellow at [phone] and select option #4.     Thanks Kristan Graves RN Care Coordinator for Dr. Yanez   Phone   [phone]                   Follow-ups after your visit        Additional Services     GASTROENTEROLOGY ADULT REF PROCEDURE ONLY [facility] [phone]       Last Lab Result: Creatinine (mg/dL)       Date                      Value                 05/31/2017               1.06 (H)         ----------  Body mass index is 35.57 kg/(m^2).     Needed:  No  Language:  English    Patient will be contacted to schedule procedure.     Please be aware that coverage of these services is subject to the terms and limitations of your health insurance plan.  Call member services at your health plan with any benefit or coverage questions.  Any procedures must be performed at a [facility] facility OR coordinated by your clinic's referral office.    Please bring the following with you to your appointment:    (1) Any X-Rays, CTs or MRIs which have been performed.  Contact the facility where they were done to arrange for  prior to your scheduled appointment.    (2) List of current medications   (3) This referral request   (4) Any documents/labs given to you for this referral            NUTRITION REFERRAL       Your provider has referred you to:  [facility]: [facility] (on call location)  - [place] [phone]   [email or web address]    Please be aware that coverage of these services is subject to the terms and limitations of your health insurance plan.  Call member services at your health plan with any benefit or coverage questions.      Please bring the following with you to your appointment:    (1) This referral request  (2) Any documents given to you regarding this referral  (3) Any specific questions you have about diet and/or food choices                  Follow-up notes from your care team     Return in about 3 months (around 2/5/2019).      Your next 10 appointments already scheduled     Nov 21, 2018  3:00 PM CST   Upper Endoscopy with Bishop Yanez MD   [facility] ([facility])    [address] [place] [address]   [phone]            Nov 28, 2018  8:30 AM CST   (Arrive by 8:15 AM)   Return Visit with Veronica Mcgraw  CHRISTINA   [facility] ([facility])    [address]  4th Floor  [facility] [address] [phone]              Future tests that were ordered for you today     Open Future Orders        Priority Expected Expires Ordered    Vitamin B2 Routine  11/6/2019 11/5/2018    Vitamin B6 Routine  11/6/2019 11/5/2018    Vitamin B3 Routine  11/6/2019 11/5/2018    Iron and iron binding capacity Routine  11/5/2019 11/5/2018    Ferritin Routine  11/5/2019 11/5/2018    CBC with platelets differential Routine  11/5/2019 11/5/2018    Reticulocyte count Routine  11/5/2019 11/5/2018    Tissue transglutaminase carol IgA and IgG Routine  11/6/2019 11/5/2018    Vitamin B12 Routine  11/5/2019 11/5/2018    Folate Routine  11/5/2019 11/5/2018    Vitamin B1 whole blood Routine  11/6/2019 11/5/2018            Who to contact     Please call your clinic at [phone] to:    Ask questions about your health    Make or cancel appointments    Discuss your medicines    Learn about your test results    Speak to your doctor            Additional Information About Your Visit        appsFreedom Information     apps[employer]edom gives you secure access to your electronic health record. If you see a primary care provider, you can also send messages to your care team and make appointments. If you have questions, please call your primary care clinic.  If you do not have a primary care provider, please call [phone] and they will assist you.      apps[employer]edom is an electronic gateway that provides easy, online access to your medical records. With apps[employer]edom, you can request a clinic appointment, read your test results, renew a prescription or communicate with your care team.     To access your existing account, please contact your [facility] or call [phone] for assistance.        Care EveryWhere ID     This is your Care EveryWhere ID. This could be used by other organizations to  "access your [facility] medical records  [id number]        Your Vitals Were     Pulse Height Last Period Pulse Oximetry BMI (Body Mass Index)       80 1.803 m (5' 11\") (LMP Unknown) 96% 35.57 kg/m2        Blood Pressure from Last 3 Encounters:   11/05/18 130/78   05/31/17 126/83    Weight from Last 3 Encounters:   11/05/18 115.7 kg (255 lb)   05/31/17 112.9 kg (249 lb)              We Performed the Following     GASTROENTEROLOGY ADULT REF PROCEDURE ONLY [facility] [phone]     NUTRITION REFERRAL          Today's Medication Changes          These changes are accurate as of 11/5/18  8:55 AM.  If you have any questions, ask your nurse or doctor.               These medicines have changed or have updated prescriptions.        Dose/Directions    * PANTOPRAZOLE SODIUM PO   This may have changed:  Another medication with the same name was added. Make sure you understand how and when to take each.   Changed by:  Rubén Tang MD        Refills:  0       * pantoprazole 40 MG EC tablet   Commonly known as:  PROTONIX   This may have changed:  You were already taking a medication with the same name, and this prescription was added. Make sure you understand how and when to take each.   Used for:  Iron deficiency anemia due to chronic blood loss   Changed by:  Rubén Tang MD        Dose:  40 mg   Take 1 tablet (40 mg) by mouth daily   Quantity:  60 tablet   Refills:  1       * Notice:  This list has 2 medication(s) that are the same as other medications prescribed for you. Read the directions carefully, and ask your doctor or other care provider to review them with you.         Where to get your medicines      These medications were sent to [facility] [place], [place] - [address]  [address], [place] [facility] [address]    Hours:  TRANSPLANT PHONE NUMBER [phone] Phone:  [phone]     pantoprazole 40 MG EC tablet                Primary Care " Provider Office Phone # Fax #    Omar Hicks [phone]       [facility] [address] [address]        Equal Access to Services     DES WILKINS : Hadii aad ku hadzeeshansarah Misangoc, oumarda johnathannashha, qakateta kakaida juana, javier hernandez mathewnati biggs laMarixasmooth hall. So [facility] [phone].    ATENCIÓN: Si habla español, tiene a byrnes disposición servicios gratuitos de asistencia lingüística. Llame al [phone].    We comply with applicable federal civil rights laws and [place] laws. We do not discriminate on the basis of race, color, national origin, age, disability, sex, sexual orientation, or gender identity.            Thank you!     Thank you for choosing [facility]  for your care. Our goal is always to provide you with excellent care. Hearing back from our patients is one way we can continue to improve our services. Please take a few minutes to complete the written survey that you may receive in the mail after your visit with us. Thank you!             Your Updated Medication List - Protect others around you: Learn how to safely use, store and throw away your medicines at www.disposemymeds.org.          This list is accurate as of 11/5/18  8:55 AM.  Always use your most recent med list.                   Brand Name Dispense Instructions for use Diagnosis    FERROUS GLUCONATE PO      Take 324 mg by mouth        MULTIVITAMIN ADULT PO       Iron deficiency anemia due to chronic blood loss       NONFORMULARY      Bio-dentical progesterone/testosterone cream        * PANTOPRAZOLE SODIUM PO           * pantoprazole 40 MG EC tablet    PROTONIX    60 tablet    Take 1 tablet (40 mg) by mouth daily    Iron deficiency anemia due to chronic blood loss       polyethylene glycol powder    MIRALAX/GLYCOLAX     Take 1 capful by mouth daily        VITAMIN C PO       Iron deficiency anemia due to chronic blood loss       ZOMIG PO           * Notice:  This list  has 2 medication(s) that are the same as other medications prescribed for you. Read the directions carefully, and ask your doctor or other care provider to review them with you.

## 2018-11-06 LAB
TTG IGA SER-ACNC: 1 U/ML
TTG IGG SER-ACNC: 1 U/ML

## 2018-11-07 LAB — VIT B6 SERPL-MCNC: 20.9 NMOL/L (ref 20–125)

## 2018-11-08 LAB
VIT B1 BLD-MCNC: 132 NMOL/L (ref 70–180)
VIT B2 SERPL-MCNC: 7 MCG/L (ref 1–19)

## 2018-11-09 ENCOUNTER — TELEPHONE (OUTPATIENT)
Dept: GASTROENTEROLOGY | Facility: CLINIC | Age: 53
End: 2018-11-09

## 2018-11-09 NOTE — TELEPHONE ENCOUNTER
No PA required on this, patient is all set.     Thank you,   Dalila Morrow  Infusion    [email or web address]  [email or web address]   Office: [phone]  Fax: [phone]

## 2018-11-09 NOTE — TELEPHONE ENCOUNTER
Pt said that she had called and told no order in.  Orders were placed on November 5th.  Attempted to transfer but pt was third in que.  Sent a pool message requesting to call pt to set up appt.

## 2018-11-09 NOTE — TELEPHONE ENCOUNTER
[facility]    Phone Message    May a detailed message be left on voicemail: yes    Reason for Call: Order(s): Other:   Reason for requested: Infusion  Date needed: soon  Provider name: Misha    Action Taken: Message routed to:  [facility] ([facility]): Pt states she is suppose to have orders for infusions. Please call her with info and to confirm

## 2018-11-13 ENCOUNTER — INFUSION THERAPY VISIT (OUTPATIENT)
Dept: INFUSION THERAPY | Facility: CLINIC | Age: 53
End: 2018-11-13
Attending: STUDENT IN AN ORGANIZED HEALTH CARE EDUCATION/TRAINING PROGRAM
Payer: COMMERCIAL

## 2018-11-13 VITALS
DIASTOLIC BLOOD PRESSURE: 87 MMHG | SYSTOLIC BLOOD PRESSURE: 126 MMHG | TEMPERATURE: 97.4 F | OXYGEN SATURATION: 97 % | RESPIRATION RATE: 18 BRPM | HEART RATE: 73 BPM

## 2018-11-13 DIAGNOSIS — D50.0 IRON DEFICIENCY ANEMIA DUE TO CHRONIC BLOOD LOSS: Primary | ICD-10-CM

## 2018-11-13 PROCEDURE — 96365 THER/PROPH/DIAG IV INF INIT: CPT

## 2018-11-13 PROCEDURE — 25000128 H RX IP 250 OP 636: Mod: ZF | Performed by: STUDENT IN AN ORGANIZED HEALTH CARE EDUCATION/TRAINING PROGRAM

## 2018-11-13 RX ADMIN — FERRIC CARBOXYMALTOSE INJECTION 750 MG: 50 INJECTION, SOLUTION INTRAVENOUS at 16:24

## 2018-11-13 NOTE — PROGRESS NOTES
Nursing Note  Tatiana Smith presents today to [facility] for:   Chief Complaint   Patient presents with     Infusion     injectafer      During today's [facility] appointment, orders from Dr. Yanez were completed.  Frequency: weekly x 2. Today is dose #1     Progress note:  Patient identification verified by name and date of birth.  Assessment completed.  Vitals recorded in Doc Flowsheets.  Patient was provided with education regarding infusion and possible side effects.  Patient verbalized understanding.      needed: No  Premedications: were not ordered.  Infusion Rates: infusion given over approximately 15 minutes.  Labs: were not ordered for this appointment.  Vascular access: peripheral IV placed today.  Treatment Conditions: non-applicable.  Patient tolerated infusion: well and was observed for 30 minutes after infusion.         Discharge Plan:   Follow up plan of care with: ongoing infusions at [facility].  Discharge instructions were reviewed with patient.  Patient/representative verbalized understanding of discharge instructions and all questions answered.  Patient discharged from [facility] in stable condition.    Krystal Moser RN    Administrations This Visit     ferric carboxymaltose (INJECTAFER) 750 mg in sodium chloride 0.9 % 100 mL intermittent infusion     Admin Date Action Dose Route Administered By             11/13/2018 New Bag 750 mg Intravenous Krystal Moser RN                          /87  Pulse 73  Temp 97.4  F (36.3  C) (Oral)  Resp 18  LMP  (LMP Unknown)  SpO2 97%

## 2018-11-13 NOTE — MR AVS SNAPSHOT
After Visit Summary   11/13/2018    Tatiana Smith    MRN: [id number]           Patient Information     Date Of Birth          1965        Visit Information        Provider Department      11/13/2018 4:00 PM UC 50 ATC; UC SPEC [facility]        Today's Diagnoses     Iron deficiency anemia due to chronic blood loss    -  1      Care Instructions    Dear Tatiana BARRERA Sarah    Thank you for choosing [facility] ([facility]) for your infusion.  The following information is a summary of our appointment as well as important reminders.          Additional information: you received your first dose of Injectafer today.       We look forward in seeing you on your next appointment here at [facility].  Please don t hesitate to call us at [phone] to reschedule any of your appointments or to speak with one of the [facility] registered nurses.  It was a pleasure taking care of you today.    Sincerely,    [facility]  [facility]  [address] [place] [address]  [address]  Phone:  [phone]      Ferric carboxymaltose injection  Brand Name: Injectafer  What is this medicine?  FERRIC CARBOXYMALTOSE (ferr-ik car-box-ee-mol-toes) is an iron complex. Iron is used to make healthy red blood cells, which carry oxygen and nutrients throughout the body. This medicine is used to treat anemia in people with chronic kidney disease or people who cannot take iron by mouth.  How should I use this medicine?  This medicine is for infusion into a vein. It is given by a health care professional in a hospital or clinic setting.  Talk to your pediatrician regarding the use of this medicine in children. Special care may be needed.  What side effects may I notice from receiving this medicine?  Side effects that you should report to your doctor or health care professional as soon  as possible:    allergic reactions like skin rash, itching or hives, swelling of the face, lips, or tongue    dizziness    facial flushing  Side effects that usually do not require medical attention (report to your doctor or health care professional if they continue or are bothersome):    changes in taste    constipation    headache    nausea, vomiting    pain, redness, or irritation at site where injected  What may interact with this medicine?  Do not take this medicine with any of the following medications:    deferoxamine    dimercaprol    other iron products  What if I miss a dose?  It is important not to miss your dose. Call your doctor or health care professional if you are unable to keep an appointment.  Where should I keep my medicine?  This drug is given in a hospital or clinic and will not be stored at home.  What should I tell my health care provider before I take this medicine?  They need to know if you have any of these conditions:    high levels of iron in the blood    liver disease    an unusual or allergic reaction to iron, other medicines, foods, dyes, or preservatives    pregnant or trying to get pregnant    breast-feeding  What should I watch for while using this medicine?  Visit your doctor or health care professional regularly. Tell your doctor if your symptoms do not start to get better or if they get worse. You may need blood work done while you are taking this medicine.  You may need to follow a special diet. Talk to your doctor. Foods that contain iron include: whole grains/cereals, dried fruits, beans, or peas, leafy green vegetables, and organ meats (liver, kidney).  NOTE:This sheet is a summary. It may not cover all possible information. If you have questions about this medicine, talk to your doctor, pharmacist, or health care provider. Copyright  2018 [employer]                Follow-ups after your visit        Your next 10 appointments already scheduled     Nov 13, 2018  4:00 PM CST    Infusion 120 with UC SPEC INFUSION, UC 50 ATC   [facility] Special[facility] Procedure ([facility])    [address]  [address]  [place] [id number] [phone]            Nov 20, 2018  3:00 PM CST   Infusion 120 with UC SPEC INFUSION, UC 51 ATC   [facility] Procedure ([facility])    [address]  [address]  [place] [facility] [id number] [phone]            Nov 21, 2018  3:00 PM CST   Upper Endoscopy with Bishop Yanez MD   [facility] ([facility])    [address]  [address]  [place] [facility] [place] [id number] [phone]            Nov 28, 2018  8:30 AM CST   (Arrive by 8:15 AM)   Return Visit with Veronica Mcgraw RD   [facility] ([facility])    [address]  4th Floor  [facility] [facility] [place] [id number] [phone]              Who to contact     If you have questions or need follow up information about today's clinic visit or your schedule please contact [facility] SPECIALTY AND PROCEDURE directly at [phone].  Normal or non-critical lab and imaging results will be communicated to you by Newtopiahart, letter or phone within 4 business days after the clinic has received the results. If you do not hear from us within 7 days, please contact the clinic through [employer] or phone. If you have a critical or abnormal lab result, we will notify you by phone as soon as possible.  Submit refill requests through [employer] or call your pharmacy and they will forward the refill request to us. Please allow 3 business days for your refill to be completed.          Additional Information About Your Visit        [employer] Information     [employer] gives you secure access to your electronic health record. If you see a primary care provider, you can also send messages to your care team  and make appointments. If you have questions, please call your primary care clinic.  If you do not have a primary care provider, please call [phone] and they will assist you.        Care EveryWhere ID     This is your Care EveryWhere ID. This could be used by other organizations to access your [facility] medical records  [id number]        Your Vitals Were     Last Period                   (LMP Unknown)            Blood Pressure from Last 3 Encounters:   11/05/18 130/78   05/31/17 126/83    Weight from Last 3 Encounters:   11/05/18 115.7 kg (255 lb)   05/31/17 112.9 kg (249 lb)              Today, you had the following     No orders found for display       Primary Care Provider Office Phone # Fax #    Omar Hicks [phone]       [facility] [address] [address]        Equal Access to Services     DES WILKINS : Hadii lucy mariee hadasho Soomaali, waaxda luqadaha, qaybta kaalmada tyroneyatrevor, javier hart . So [facility] [phone].    ATENCIÓN: Si habla español, tiene a byrnes disposición servicios gratuitos de asistencia lingüística. Omid al [phone].    We comply with applicable federal civil rights laws and [place] laws. We do not discriminate on the basis of race, color, national origin, age, disability, sex, sexual orientation, or gender identity.            Thank you!     Thank you for choosing [facility] SPECIAL[facility] PROCEDURE  for your care. Our goal is always to provide you with excellent care. Hearing back from our patients is one way we can continue to improve our services. Please take a few minutes to complete the written survey that you may receive in the mail after your visit with us. Thank you!             Your Updated Medication List - Protect others around you: Learn how to safely use, store and throw away your medicines at www.disposemymeds.org.          This list is accurate as of 11/13/18  3:59 PM.   Always use your most recent med list.                   Brand Name Dispense Instructions for use Diagnosis    FERROUS GLUCONATE PO      Take 324 mg by mouth        MULTIVITAMIN ADULT PO       Iron deficiency anemia due to chronic blood loss       NONFORMULARY      Bio-dentical progesterone/testosterone cream        * PANTOPRAZOLE SODIUM PO           * pantoprazole 40 MG EC tablet    PROTONIX    60 tablet    Take 1 tablet (40 mg) by mouth daily    Iron deficiency anemia due to chronic blood loss       polyethylene glycol powder    MIRALAX/GLYCOLAX     Take 1 capful by mouth daily        VITAMIN C PO       Iron deficiency anemia due to chronic blood loss       ZOMIG PO           * Notice:  This list has 2 medication(s) that are the same as other medications prescribed for you. Read the directions carefully, and ask your doctor or other care provider to review them with you.

## 2018-11-14 ENCOUNTER — TELEPHONE (OUTPATIENT)
Dept: GASTROENTEROLOGY | Facility: OUTPATIENT CENTER | Age: 53
End: 2018-11-14

## 2018-11-14 LAB — NIACIN SERPL-MCNC: 2.99 UG/ML (ref 0.5–8.45)

## 2018-11-15 ENCOUNTER — TELEPHONE (OUTPATIENT)
Dept: GASTROENTEROLOGY | Facility: OUTPATIENT CENTER | Age: 53
End: 2018-11-15

## 2018-11-15 DIAGNOSIS — D50.9 IDA (IRON DEFICIENCY ANEMIA): Primary | ICD-10-CM

## 2018-11-15 NOTE — TELEPHONE ENCOUNTER
Patient taking any blood thinners ? No     Heart disease ? Denies     Lung disease ? Denies       Sleep apnea ? Denies     Diabetic ? Denies     Kidney disease ? Denies     Dialysis ? N/a     Electronic implanted medical devices ? Denies     Are you taking any narcotic pain medication ? No   What is your daily dosage ?    PTSD ? N/a     Prep instructions reviewed with patient ? Patient declined review.  policy, mac sedation plan reviewed. Advised patient to have someone stay with her post exam    Pharmacy :[facility]    Indication for procedure : Iron deficiency anemia due to chronic blood loss [D50.0]     Referring provider :Rubén Tang MD      Arrival Time : 2 PM

## 2018-11-16 ENCOUNTER — TELEPHONE (OUTPATIENT)
Dept: GASTROENTEROLOGY | Facility: CLINIC | Age: 53
End: 2018-11-16

## 2018-11-16 NOTE — TELEPHONE ENCOUNTER
LVM for patient in regards to scheduling appointment with Amarilys Waters in 4 months. Left call back number for patient to call and schedule appointment.

## 2018-11-20 ENCOUNTER — INFUSION THERAPY VISIT (OUTPATIENT)
Dept: INFUSION THERAPY | Facility: CLINIC | Age: 53
End: 2018-11-20
Attending: STUDENT IN AN ORGANIZED HEALTH CARE EDUCATION/TRAINING PROGRAM
Payer: COMMERCIAL

## 2018-11-20 VITALS
SYSTOLIC BLOOD PRESSURE: 129 MMHG | OXYGEN SATURATION: 98 % | HEART RATE: 101 BPM | TEMPERATURE: 97.9 F | DIASTOLIC BLOOD PRESSURE: 89 MMHG | RESPIRATION RATE: 16 BRPM

## 2018-11-20 DIAGNOSIS — D50.0 IRON DEFICIENCY ANEMIA DUE TO CHRONIC BLOOD LOSS: Primary | ICD-10-CM

## 2018-11-20 PROCEDURE — 96365 THER/PROPH/DIAG IV INF INIT: CPT

## 2018-11-20 PROCEDURE — 25000128 H RX IP 250 OP 636: Mod: ZF | Performed by: STUDENT IN AN ORGANIZED HEALTH CARE EDUCATION/TRAINING PROGRAM

## 2018-11-20 RX ADMIN — SODIUM CHLORIDE 50 ML: 9 INJECTION, SOLUTION INTRAVENOUS at 15:20

## 2018-11-20 RX ADMIN — FERRIC CARBOXYMALTOSE INJECTION 750 MG: 50 INJECTION, SOLUTION INTRAVENOUS at 15:20

## 2018-11-20 NOTE — PROGRESS NOTES
Nursing Note  Tatiana Smith [facility] for:   Chief Complaint   Patient presents with     Infusion     Injectafer (ferric carboxymaltose)     During today's [facility] appointment, orders from Dr. Tang were completed.  Frequency: today is dose 2 of 2 total.    Progress note:  Patient identification verified by name and date of birth.  Assessment completed.  Vitals recorded in Doc Flowsheets.  Patient was provided with education regarding infusion and possible side effects.  Patient verbalized understanding.      needed: No  Premedications: were not ordered.  Infusion Rates: 475 ml/hr given over approximately 15 minutes.  Approximate appointment length:1 hours.   Labs: were not ordered for this appointment.  Vascular access: peripheral IV placed today.  Treatment Conditions: patient monitored for 30 minutes after medication was infused.  Patient tolerated infusion: well.    Drug Waste Record? No     Discharge Plan:   Follow up plan of care with: ongoing infusions at [facility].  Discharge instructions were reviewed with patient.  Patient/representative verbalized understanding of discharge instructions and all questions answered.  Patient discharged from [facility] in stable condition.    Katerina Liao RN    Administrations This Visit     0.9% sodium chloride BOLUS     Admin Date Action Dose Route Administered By             11/20/2018 New Bag 50 mL Intravenous Katerina Liao RN                    ferric carboxymaltose (INJECTAFER) 750 mg in sodium chloride 0.9 % 100 mL intermittent infusion     Admin Date Action Dose Route Administered By             11/20/2018 New Bag 750 mg Intravenous Katerina Liao RN                          /88  Pulse 106  Temp 97.9  F (36.6  C) (Oral)  Resp 16  LMP  (LMP Unknown)  SpO2 98%

## 2018-11-20 NOTE — PATIENT INSTRUCTIONS
Dear Tatiana Smith    Thank you for choosing [facility] ([facility]) for your infusion.  The following information is a summary of our appointment as well as important reminders.      Additional information: Your infusion of   ferric carboxymaltose (INJECTAFER) 750 mg in sodium chloride 0.9 % 100 mL intermittent infusion       We look forward in seeing you on your next appointment here at [facility].  Please don t hesitate to call us at [phone] to reschedule any of your appointments or to speak with one of the [facility] registered nurses.  It was a pleasure taking care of you today.    Sincerely,    [facility]  Specialty [facility]fusion [facility]  [address] [place] [address]  [address]  Phone:  [phone]    Ferric carboxymaltose Solution for injection  What is this medicine?  FERRIC CARBOXYMALTOSE (ferr-ik car-box-ee-mol-toes) is an iron complex. Iron is used to make healthy red blood cells, which carry oxygen and nutrients throughout the body. This medicine is used to treat anemia in people with chronic kidney disease or people who cannot take iron by mouth.  This medicine may be used for other purposes; ask your health care provider or pharmacist if you have questions.  What should I tell my health care provider before I take this medicine?  They need to know if you have any of these conditions:    anemia not caused by low iron levels    high levels of iron in the blood    liver disease    an unusual or allergic reaction to iron, other medicines, foods, dyes, or preservatives    pregnant or trying to get pregnant    breast-feeding  How should I use this medicine?  This medicine is for infusion into a vein. It is given by a health care professional in a hospital or clinic setting.  Talk to your pediatrician regarding the use of this medicine in children. Special care may be needed.  Overdosage: If you think you've taken too  much of this medicine contact a poison control center or emergency room at once.  NOTE: This medicine is only for you. Do not share this medicine with others.  What if I miss a dose?  It is important not to miss your dose. Call your doctor or health care professional if you are unable to keep an appointment.  What may interact with this medicine?  Do not take this medicine with any of the following medications:  deferoxamine  dimercaprol  other iron products  This medicine may also interact with the following medications:  chloramphenicol  deferasirox  This list may not describe all possible interactions. Give your health care provider a list of all the medicines, herbs, non-prescription drugs, or dietary supplements you use. Also tell them if you smoke, drink alcohol, or use illegal drugs. Some items may interact with your medicine.  What should I watch for while using this medicine?  Visit your doctor or health care professional regularly. Tell your doctor if your symptoms do not start to get better or if they get worse. You may need blood work done while you are taking this medicine.  You may need to follow a special diet. Talk to your doctor. Foods that contain iron include: whole grains/cereals, dried fruits, beans, or peas, leafy green vegetables, and organ meats (liver, kidney).  What side effects may I notice from receiving this medicine?  Side effects that you should report to your doctor or health care professional as soon as possible:  allergic reactions like skin rash, itching or hives, swelling of the face, lips, or tonguebreathing problems  changes in blood pressure  feeling faint or lightheaded, falls  flushing, sweating, or hot feelings  Side effects that usually do not require medical attention (Report these to your doctor or health care professional if they continue or are bothersome.):  changes in taste  constipation  dizziness  headache  nausea  pain, redness, or irritation at site where  injected  vomiting  This list may not describe all possible side effects. Call your doctor for medical advice about side effects. You may report side effects to FDA at 1-829-UNZ-0255.  Where should I keep my medicine?  This drug is given in a hospital or clinic and will not be stored at home.  NOTE: This sheet is a summary. It may not cover all possible information. If you have questions about this medicine, talk to your doctor, pharmacist, or health care provider.  NOTE:This sheet is a summary. It may not cover all possible information. If you have questions about this medicine, talk to your doctor, pharmacist, or health care provider. Copyright  2016 [employer]

## 2018-11-20 NOTE — MR AVS SNAPSHOT
After Visit Summary   11/20/2018    Tatiana Smith    MRN: [id number]           Patient Information     Date Of Birth          1965        Visit Information        Provider Department      11/20/2018 3:00 PM  51 ATC;  SPEC [facility] Special[facility] Procedure        Today's Diagnoses     Iron deficiency anemia due to chronic blood loss    -  1      Care Instructions    Dear Tatiana BARRERA Sarah    Thank you for choosing [facility] ([facility]) for your infusion.  The following information is a summary of our appointment as well as important reminders.      Additional information: Your infusion of   ferric carboxymaltose (INJECTAFER) 750 mg in sodium chloride 0.9 % 100 mL intermittent infusion       We look forward in seeing you on your next appointment here at [facility].  Please don t hesitate to call us at [phone] to reschedule any of your appointments or to speak with one of the [facility] registered nurses.  It was a pleasure taking care of you today.    Sincerely,    [facility]  [address] [place] [address]  [address]  Phone:  [phone]    Ferric carboxymaltose Solution for injection  What is this medicine?  FERRIC CARBOXYMALTOSE (ferr-ik car-box-ee-mol-toes) is an iron complex. Iron is used to make healthy red blood cells, which carry oxygen and nutrients throughout the body. This medicine is used to treat anemia in people with chronic kidney disease or people who cannot take iron by mouth.  This medicine may be used for other purposes; ask your health care provider or pharmacist if you have questions.  What should I tell my health care provider before I take this medicine?  They need to know if you have any of these conditions:    anemia not caused by low iron levels    high levels of iron in the blood    liver disease    an unusual or  allergic reaction to iron, other medicines, foods, dyes, or preservatives    pregnant or trying to get pregnant    breast-feeding  How should I use this medicine?  This medicine is for infusion into a vein. It is given by a health care professional in a hospital or clinic setting.  Talk to your pediatrician regarding the use of this medicine in children. Special care may be needed.  Overdosage: If you think you've taken too much of this medicine contact a poison control center or emergency room at once.  NOTE: This medicine is only for you. Do not share this medicine with others.  What if I miss a dose?  It is important not to miss your dose. Call your doctor or health care professional if you are unable to keep an appointment.  What may interact with this medicine?  Do not take this medicine with any of the following medications:  deferoxamine  dimercaprol  other iron products  This medicine may also interact with the following medications:  chloramphenicol  deferasirox  This list may not describe all possible interactions. Give your health care provider a list of all the medicines, herbs, non-prescription drugs, or dietary supplements you use. Also tell them if you smoke, drink alcohol, or use illegal drugs. Some items may interact with your medicine.  What should I watch for while using this medicine?  Visit your doctor or health care professional regularly. Tell your doctor if your symptoms do not start to get better or if they get worse. You may need blood work done while you are taking this medicine.  You may need to follow a special diet. Talk to your doctor. Foods that contain iron include: whole grains/cereals, dried fruits, beans, or peas, leafy green vegetables, and organ meats (liver, kidney).  What side effects may I notice from receiving this medicine?  Side effects that you should report to your doctor or health care professional as soon as possible:  allergic reactions like skin rash, itching or  hives, swelling of the face, lips, or tonguebreathing problems  changes in blood pressure  feeling faint or lightheaded, falls  flushing, sweating, or hot feelings  Side effects that usually do not require medical attention (Report these to your doctor or health care professional if they continue or are bothersome.):  changes in taste  constipation  dizziness  headache  nausea  pain, redness, or irritation at site where injected  vomiting  This list may not describe all possible side effects. Call your doctor for medical advice about side effects. You may report side effects to FDA at 7-028-TSK-5132.  Where should I keep my medicine?  This drug is given in a hospital or clinic and will not be stored at home.  NOTE: This sheet is a summary. It may not cover all possible information. If you have questions about this medicine, talk to your doctor, pharmacist, or health care provider.  NOTE:This sheet is a summary. It may not cover all possible information. If you have questions about this medicine, talk to your doctor, pharmacist, or health care provider. Copyright  2016 Gold Standard                  Follow-ups after your visit        Your next 10 appointments already scheduled     Nov 21, 2018  3:00 PM CST   Upper Endoscopy with Bishop Yanez MD   [facility] ([facility])    [address] [address] [phone]            Nov 28, 2018  8:30 AM CST   (Arrive by 8:15 AM)   Return Visit with Veronica Mcgraw RD   [facility] ([facility])    [address] [facility]  [address] [facility] [address] [phone]              Who to contact     If you have questions or need follow up information about today's clinic visit or your schedule please contact [facility] directly at [phone].  Normal or non-critical lab and imaging results will be  communicated to you by Solsticehart, letter or phone within 4 business days after the clinic has received the results. If you do not hear from us within 7 days, please contact the clinic through OpenWhere or phone. If you have a critical or abnormal lab result, we will notify you by phone as soon as possible.  Submit refill requests through OpenWhere or call your pharmacy and they will forward the refill request to us. Please allow 3 business days for your refill to be completed.          Additional Information About Your Visit        OpenWhere Information     OpenWhere gives you secure access to your electronic health record. If you see a primary care provider, you can also send messages to your care team and make appointments. If you have questions, please call your primary care clinic.  If you do not have a primary care provider, please call [phone] and they will assist you.        Care EveryWhere ID     This is your Care EveryWhere ID. This could be used by other organizations to access your [facility] medical records  [id number]        Your Vitals Were     Pulse Temperature Respirations Last Period Pulse Oximetry       106 97.9  F (36.6  C) (Oral) 16 (LMP Unknown) 98%        Blood Pressure from Last 3 Encounters:   11/20/18 133/88   11/13/18 126/87   11/05/18 130/78    Weight from Last 3 Encounters:   11/05/18 115.7 kg (255 lb)   05/31/17 112.9 kg (249 lb)              Today, you had the following     No orders found for display       Primary Care Provider Office Phone # Fax #    Omar Hicks [phone]       [facility] [address] [facility] [address]        Equal Access to Services     NESTOR WILKINS AH: Hadii aad ku hadasho Soomaali, waaxda luqadaha, qaybta kaalmada adeegjavier mar . So [facility] [phone].    ATENCIÓN: Si habla español, tiene a byrnes disposición servicios gratuitos de asistencia lingüística. Llame al [phone].    We comply with  applicable federal civil rights laws and [place] laws. We do not discriminate on the basis of race, color, national origin, age, disability, sex, sexual orientation, or gender identity.            Thank you!     Thank you for choosing [facility] SPECIALTY AND PROCEDURE  for your care. Our goal is always to provide you with excellent care. Hearing back from our patients is one way we can continue to improve our services. Please take a few minutes to complete the written survey that you may receive in the mail after your visit with us. Thank you!             Your Updated Medication List - Protect others around you: Learn how to safely use, store and throw away your medicines at www.disposemymeds.org.          This list is accurate as of 11/20/18  3:15 PM.  Always use your most recent med list.                   Brand Name Dispense Instructions for use Diagnosis    FERROUS GLUCONATE PO      Take 324 mg by mouth        MULTIVITAMIN ADULT PO       Iron deficiency anemia due to chronic blood loss       NONFORMULARY      Bio-dentical progesterone/testosterone cream        pantoprazole 40 MG EC tablet    PROTONIX    60 tablet    Take 1 tablet (40 mg) by mouth daily    Iron deficiency anemia due to chronic blood loss       polyethylene glycol powder    MIRALAX/GLYCOLAX     Take 1 capful by mouth daily        VITAMIN C PO       Iron deficiency anemia due to chronic blood loss       ZOMIG PO

## 2018-11-21 ENCOUNTER — DOCUMENTATION ONLY (OUTPATIENT)
Dept: GASTROENTEROLOGY | Facility: OUTPATIENT CENTER | Age: 53
End: 2018-11-21
Payer: COMMERCIAL

## 2018-11-21 ENCOUNTER — TRANSFERRED RECORDS (OUTPATIENT)
Dept: HEALTH INFORMATION MANAGEMENT | Facility: CLINIC | Age: 53
End: 2018-11-21

## 2018-11-21 DIAGNOSIS — D50.9 IDA (IRON DEFICIENCY ANEMIA): Primary | ICD-10-CM

## 2018-11-27 ENCOUNTER — MYC MEDICAL ADVICE (OUTPATIENT)
Dept: GASTROENTEROLOGY | Facility: CLINIC | Age: 53
End: 2018-11-27

## 2018-11-27 LAB — COPATH REPORT: NORMAL

## 2018-11-29 ENCOUNTER — TELEPHONE (OUTPATIENT)
Dept: SURGERY | Facility: CLINIC | Age: 53
End: 2018-11-29

## 2018-11-29 NOTE — TELEPHONE ENCOUNTER
JUDAH for patient, call back to schedule an appt with Yoly Smith NP in Colon and Rectal Surgery.    Sabrina ZAMBRANO LPN

## 2018-12-03 NOTE — TELEPHONE ENCOUNTER
Called patient to schedule appt with Yoly Smith NP. Scheduled appt for 1/11/19 @ 11:15am. Confirmed date/time/location with patient. Patient request to be put on wait list.     Sabrina ZAMBRANO LPN

## 2019-02-04 ENCOUNTER — DOCUMENTATION ONLY (OUTPATIENT)
Dept: CARE COORDINATION | Facility: CLINIC | Age: 54
End: 2019-02-04

## 2019-02-11 ASSESSMENT — ENCOUNTER SYMPTOMS
JAUNDICE: 0
ABDOMINAL PAIN: 0
VOMITING: 0
SWOLLEN GLANDS: 0
NAUSEA: 0
DIARRHEA: 0
CONSTIPATION: 0
BLOATING: 0
HEARTBURN: 1
RECTAL PAIN: 0
BRUISES/BLEEDS EASILY: 0
BLOOD IN STOOL: 1
BOWEL INCONTINENCE: 0

## 2019-02-21 ENCOUNTER — TELEPHONE (OUTPATIENT)
Dept: GASTROENTEROLOGY | Facility: CLINIC | Age: 54
End: 2019-02-21

## 2019-02-21 NOTE — TELEPHONE ENCOUNTER
Called and left message for patient reminding of appointment scheduled on 2/25/19 at 10am with [facility]. Patient to arrive 15 min early. If need to be reschedule, patient to call [phone].    CHITO Gomes

## 2019-02-25 ENCOUNTER — OFFICE VISIT (OUTPATIENT)
Dept: GASTROENTEROLOGY | Facility: CLINIC | Age: 54
End: 2019-02-25
Payer: COMMERCIAL

## 2019-02-25 VITALS
SYSTOLIC BLOOD PRESSURE: 136 MMHG | DIASTOLIC BLOOD PRESSURE: 86 MMHG | OXYGEN SATURATION: 97 % | HEIGHT: 71 IN | BODY MASS INDEX: 31.85 KG/M2 | HEART RATE: 83 BPM | WEIGHT: 227.5 LBS

## 2019-02-25 DIAGNOSIS — D50.9 IRON DEFICIENCY ANEMIA, UNSPECIFIED IRON DEFICIENCY ANEMIA TYPE: ICD-10-CM

## 2019-02-25 DIAGNOSIS — Z86.2 HISTORY OF ANEMIA: Primary | ICD-10-CM

## 2019-02-25 DIAGNOSIS — K64.4 EXTERNAL HEMORRHOIDS: ICD-10-CM

## 2019-02-25 LAB
BASOPHILS # BLD AUTO: 0 10E9/L (ref 0–0.2)
BASOPHILS NFR BLD AUTO: 0.4 %
DIFFERENTIAL METHOD BLD: ABNORMAL
EOSINOPHIL # BLD AUTO: 0.4 10E9/L (ref 0–0.7)
EOSINOPHIL NFR BLD AUTO: 5.6 %
ERYTHROCYTE [DISTWIDTH] IN BLOOD BY AUTOMATED COUNT: 16.5 % (ref 10–15)
FERRITIN SERPL-MCNC: 22 NG/ML (ref 8–252)
HCT VFR BLD AUTO: 47.5 % (ref 35–47)
HGB BLD-MCNC: 14 G/DL (ref 11.7–15.7)
IMM GRANULOCYTES # BLD: 0 10E9/L (ref 0–0.4)
IMM GRANULOCYTES NFR BLD: 0.3 %
IRON SATN MFR SERPL: 22 % (ref 15–46)
IRON SERPL-MCNC: 68 UG/DL (ref 35–180)
LYMPHOCYTES # BLD AUTO: 1.1 10E9/L (ref 0.8–5.3)
LYMPHOCYTES NFR BLD AUTO: 14.1 %
MCH RBC QN AUTO: 25.9 PG (ref 26.5–33)
MCHC RBC AUTO-ENTMCNC: 29.5 G/DL (ref 31.5–36.5)
MCV RBC AUTO: 88 FL (ref 78–100)
MONOCYTES # BLD AUTO: 0.6 10E9/L (ref 0–1.3)
MONOCYTES NFR BLD AUTO: 7.6 %
NEUTROPHILS # BLD AUTO: 5.4 10E9/L (ref 1.6–8.3)
NEUTROPHILS NFR BLD AUTO: 72 %
NRBC # BLD AUTO: 0 10*3/UL
NRBC BLD AUTO-RTO: 0 /100
PLATELET # BLD AUTO: 176 10E9/L (ref 150–450)
RBC # BLD AUTO: 5.41 10E12/L (ref 3.8–5.2)
TIBC SERPL-MCNC: 316 UG/DL (ref 240–430)
WBC # BLD AUTO: 7.5 10E9/L (ref 4–11)

## 2019-02-25 ASSESSMENT — MIFFLIN-ST. JEOR: SCORE: 1733.06

## 2019-02-25 ASSESSMENT — PAIN SCALES - GENERAL: PAINLEVEL: NO PAIN (0)

## 2019-02-25 NOTE — PROGRESS NOTES
GI CLINIC VISIT    CC/REFERRING MD:  Bishop Yanez  REASON FOR CONSULTATION: follow-up    ASSESSMENT/PLAN:  1. Iron deficiency anemia   53 year old female with PMH of gastric bypass (Shaina en Y) in 1986, marginal ulcer on EGD 2/2016 with chronic iron deficiency anemia, who presents for follow-up for iron deficiency anemia.  Recent upper endoscopy and colonoscopy without source of overt bleeding.  Celiac markers and jejunal biopsy negative for celiac disease.  After last visit, patient was found to have decreased iron to 12, decreased ferritin, and hemoglobin of 8.3 with normal B12 and folate.  She completed 2 iron infusions of Injectafer and notes improvement in overall fatigue, body aches, and globus sensation.  Will plan to recheck iron ferritin and CBC today.  Suspect that anemia likely due to history of bypass of the duodenum in the setting of history of gastric bypass.  History of hemorrhoid may also be a contributing to anemia which has recently been better controlled after starting MiraLAX daily.  Referral was placed for colorectal surgery if patient develops hemorrhoidal bleeding again, and she was encouraged to continue miralax on a daily basis.  She should continue to eat healthy iron rich diet she has been doing, and can discuss questions with our dietitian.  Would recommend regular monitoring of CBC and iron studies through primary care provider.  We discussed that if she again develops extreme fatigue, bright red blood per rectum, patient should seek more urgent care.   -- labs today   -- if you develop bleeding from your hemorrhoids, please see colorectal surgery   -- Miralax can be used to treat constipation and works by increasing the amount of water in the stool. Start with 1 caps in 8 oz of water. You may increase or decrease dose as needed   -- can meet with our dietitian if you have further questions regarding iron rich foods     Colorectal cancer screening: Normal colonoscopy in 2018,  repeat in 2028 or with change or worsening symptoms.    RTC 4-6 months (pending symptoms)     Thank you for this consultation. Patient discussed with Dr. Krueger. It was a pleasure to participate in the care of this patient; please contact us with any further questions.       Amarilys Waters PA-C  Division of Gastroenterology, Hepatology & Nutrition  [facility]    ADDENDUM: Patient discussed with Luz Marina Potts from bariatric surgery who reccomends yearly gastric bypass labs including CBC, CMP, ferritin, B1, B12, D, A, PTH through primary care provider. Will send patient Conductivhart message to ensure that she is taking multivitamin with oral iron (and probably additional supplement if she can tolerate this). She can have frequent hgb and ferritin checks with possible iron infusions as directed by PCP.  Will also recommend that she avoids NSAIDS or smoking. Patient called and voicemail left instructing her to contact the clinic.     HPI  Summarized/taken from previous appointment:   Tatiana Smith is a 53 year old female with PMH of gastric bypass (Shaina en Y) in 1986, marginal ulcer on EGD 2/2016 with chronic iron deficiency anemia, who presents for further evaluation of iron deficiency anemia marginal ulcers.      Initially had melena in 2/2016 when in [place] (there frequently to help her daughter who is in nursing school and went through a divorce) with Hgb of 5.3. EGD at that time revealed clean based marginal ulcer. She was recommended to discontinue NSAIDs and take PPI and PO iron. In 7/2017, she had BRBPR so underwent colonoscopy in [place] which revealed internal/external hemorrhoids. Hgb was 6.6 at that time. She subsequently followed up in [facility] clinic 7/2018 with Hgb in the 8s and MCV in high 60s-70s, and then in [place] with internal medicine last month with Hgb 8.7 (was 9.0) one month prior. She continues on oral iron, taking 1-2 tabs per day. She has taken PPI off and on, most recently daily for one  month ending 2 weeks ago. She uses NSAIDs, ibuprofen 2x/week for aches in her legs.      She denies any recent black stool. She notes BRBPR every 2 weeks which is small amount and sometimes only noted on toilet paper. Normally has one formed BM every other day. Notes reduced exercise tolerance and easy fatiguing with anemia. No abdominal pain, N/V, dys/odynophagia. Weight stable. No fevers/chills.      No other sources of bleeding - perimenopausal, so one period every 2-3 months, not heavy. No nosebleeds. No easy brusing.     Interval History:  The patient underwent upper endoscopy 11/21/2018 notable for normal esophagus, stomach with evidence of previous surgical procedure with healthy anastomosis no ulcers, with normal jejunum. Biopsies for celiac sprue were normal. Colonoscopy with normal ileum and entire colon.     The patient notes she is overall feeling improved and states she no longer has significant body aches, issues with swallowing, or headaches.  Fatigue has improved significantly and she continues to have fatigue by the end of the day.  She describes her bowel pattern as Napa scale 5 bowel movement every 2 days since starting MiraLAX 1 capful a day.  Reports that constipation and straining has improved and has not had bleeding hemorrhoid since colonoscopy.  Reports that iron by mouth worsened constipation, and she felt better after 2 iron infusions.  She is no longer taking iron or pantoprazole.  Denies any other hematochezia or melena.    ROS:    No fevers or chills  + weight loss- intentional. Less sugar and less carbs . Looking for iron rich foods   No blurry vision, double vision or change in vision  No sore throat  No lymphadenopathy  No headache, paraesthesias, or weakness in a limb  No shortness of breath or wheezing  No chest pain or pressure  No arthralgias or myalgias  No rashes or skin changes  No odynophagia or dysphagia  No BRBPR, hematochezia, melena  No dysuria, frequency or  urgency  No hot/cold intolerance or polyria  No anxiety or depression    PROBLEM LIST  Patient Active Problem List    Diagnosis Date Noted     Iron deficiency anemia due to chronic blood loss 11/05/2018     Priority: Medium       PERTINENT PAST MEDICAL HISTORY:  Past Medical History:   Diagnosis Date     Anemia      H/O gastric bypass 1986     Ulcer, gastric, acute        PREVIOUS SURGERIES:  Past Surgical History:   Procedure Laterality Date     GI SURGERY         ALLERGIES:     Allergies   Allergen Reactions     Food      Chocolate       PERTINENT MEDICATIONS:    Current Outpatient Medications:      Ascorbic Acid (VITAMIN C PO), , Disp: , Rfl:      FERROUS GLUCONATE PO, Take 324 mg by mouth, Disp: , Rfl:      Multiple Vitamins-Minerals (MULTIVITAMIN ADULT PO), , Disp: , Rfl:      NONFORMULARY, Bio-dentical progesterone/testosterone cream, Disp: , Rfl:      pantoprazole (PROTONIX) 40 MG EC tablet, Take 1 tablet (40 mg) by mouth daily, Disp: 60 tablet, Rfl: 1     polyethylene glycol (MIRALAX/GLYCOLAX) powder, Take 1 capful by mouth daily, Disp: , Rfl:      ZOLMitriptan (ZOMIG PO), , Disp: , Rfl:     SOCIAL HISTORY:  No alochol, no smoking, not working   Social History     Socioeconomic History     Marital status:      Spouse name: Not on file     Number of children: Not on file     Years of education: Not on file     Highest education level: Not on file   Occupational History     Not on file   Social Needs     Financial resource strain: Not on file     Food insecurity:     Worry: Not on file     Inability: Not on file     Transportation needs:     Medical: Not on file     Non-medical: Not on file   Tobacco Use     Smoking status: Never Smoker     Smokeless tobacco: Never Used   Substance and Sexual Activity     Alcohol use: No     Drug use: No     Sexual activity: Not on file   Lifestyle     Physical activity:     Days per week: Not on file     Minutes per session: Not on file     Stress: Not on file  "  Relationships     Social connections:     Talks on phone: Not on file     Gets together: Not on file     Attends Hoahaoism service: Not on file     Active member of club or organization: Not on file     Attends meetings of clubs or organizations: Not on file     Relationship status: Not on file     Intimate partner violence:     Fear of current or ex partner: Not on file     Emotionally abused: Not on file     Physically abused: Not on file     Forced sexual activity: Not on file   Other Topics Concern     Not on file   Social History Narrative     Not on file       FAMILY HISTORY:  FH of CRC: none   FH of IBD: none    Mother with irritable bowel syndrome   No family history on file.    Past/family/social history reviewed and no changes    PHYSICAL EXAMINATION:  Constitutional: aaox3, cooperative, pleasant, not dyspneic/diaphoretic, no acute distress  Vitals reviewed: /86   Pulse 83   Ht 1.803 m (5' 11\")   Wt 103.2 kg (227 lb 8 oz)   SpO2 97%   BMI 31.73 kg/m    Wt:   Wt Readings from Last 2 Encounters:   02/25/19 103.2 kg (227 lb 8 oz)   11/05/18 115.7 kg (255 lb)      Eyes: Sclera anicteric/injected  Ears/nose/mouth/throat: Normal oropharynx without ulcers or exudate, mucus membranes moist, hearing intact  Neck: supple, thyroid normal size  CV: No edema  Respiratory: Unlabored breathing  Lymph: No submandibular, supraclavicular or lymphadenopathy  Abd:  Nondistended, no hepatosplenomegaly, nontender, no peritoneal signs  Skin: warm, perfused, no jaundice  Psych: Normal affect  MSK: Normal gait      PERTINENT STUDIES:    Documentation Only on 11/21/2018   Component Date Value Ref Range Status     Copath Report 11/21/2018    Final                    Value:Patient Name: GIDEON THORPE  MR#: [id number]  Specimen #: [id number]  Collected: 11/21/2018  Received: 11/23/2018  Reported: 11/27/2018 11:30  Ordering Phy(s): VENITA BERG  Additional Phy(s): [facility]: [facility]    For [facility] " "result formatting, select 'View Enhanced Report Format' under   Linked Documents section.    SPECIMEN(S):  Jejunum biopsy    FINAL DIAGNOSIS:  JEJUNUM BIOPSY:  - Jejunal mucosa with no significant histologic abnormality  - No evidence of celiac sprue or peptic jejunitis    I have personally reviewed all specimens and/or slides, including the   listed special stains, and used them  with my medical judgement to determine or confirm the final diagnosis.    Electronically signed out by:    Marissa Fabian M.D., PhD, [facility]    CLINICAL HISTORY:  Iron deficiency anemia in a patient with prior bariatric surgery, rule out   celiac sprue  GROSS:  The specimen is received in formalin with proper patient identification,   labeled \"jejunum bio                          psy\".  The  specimen consists of three tan-brown pieces of irregular soft tissue   ranging from 0.3-0.6 cm in greatest  dimension.  The specimen is entirely submitted in cassette A1. (Dictated   by: Guzman Lee 11/23/2018 08:43  AM)    MICROSCOPIC:  Microscopic examination was performed.    CPT Codes:  A: 24355-LN6    TESTING LAB LOCATION:  [address]   [address]  [phone]    COLLECTION SITE:  [facility]  [facility]    Resident  [id number]           Answers for HPI/ROS submitted by the patient on 2/11/2019   General Symptoms: No  Skin Symptoms: No  HENT Symptoms: No  EYE SYMPTOMS: No  HEART SYMPTOMS: No  LUNG SYMPTOMS: No  INTESTINAL SYMPTOMS: Yes  URINARY SYMPTOMS: No  GYNECOLOGIC SYMPTOMS: No  BREAST SYMPTOMS: No  SKELETAL SYMPTOMS: No  BLOOD SYMPTOMS: Yes  NERVOUS SYSTEM SYMPTOMS: No  MENTAL HEALTH SYMPTOMS: No  Heart burn or indigestion: Yes  Nausea: No  Vomiting: No  Abdominal pain: No  Bloating: No  Constipation: No  Diarrhea: No  Blood in stool: Yes  Black stools: No  Rectal or Anal pain: No  Fecal " incontinence: No  Yellowing of skin or eyes: No  Vomit with blood: No  Change in stools: No  Anemia: Yes  Swollen glands: No  Easy bleeding or bruising: No  Edema or swelling: No

## 2019-02-25 NOTE — PATIENT INSTRUCTIONS
It was a pleasure taking care of you today.  I've included a brief summary of our discussion and care plan from today's visit below.  Please review this information with your primary care provider.  ______________________________________________________________________    My recommendations are summarized as follows:    -- labs today     -- if you develop bleeding from your hemorrhoids, please see colorectal surgery     -- Miralax can be used to treat constipation and works by increasing the amount of water in the stool. Start with 1 caps in 8 oz of water. You may increase or decrease dose as needed     -- can meet with our dietitian if you have further questions regarding iron rich foods     Return to GI Clinic in 4-6 months to review your progress.    ______________________________________________________________________    Who do I call with any questions after my visit?  Please be in touch if there are any further questions that arise following today's visit.  There are multiple ways to contact your gastroenterology care team.        During business hours, you may reach a Gastroenterology nurse at [phone]      To schedule or reschedule an appointment, please call [phone].       You can always send a secure message through [employer].  [employer] messages are answered by your nurse or doctor typically within 24 hours.  Please allow extra time on weekends and holidays.        For urgent/emergent questions after business hours, you may reach the on-call GI Fellow by contacting the [facility] at [phone].     How will I get the results of any tests ordered?    You will receive all of your results.  If you have signed up for [employer], any tests ordered at your visit will be available to you after your physician reviews them.  Typically this takes 1-2 weeks.  If there are urgent results that require a change in your care plan, your physician or nurse will call you to discuss the next steps.       What is [employer]?  [employer] is a secure way for you to access all of your healthcare records from the [facility].  It is a web based computer program, so you can sign on to it from any location.  It also allows you to send secure messages to your care team.  I recommend signing up for [employer] access if you have not already done so and are comfortable with using a computer.      How to I schedule a follow-up visit?  If you did not schedule a follow-up visit today, please call [phone] to schedule a follow-up office visit.        Sincerely,    Amarilys Waters PA-C  Division of Gastroenterology, Hepatology & Nutrition  [facility]

## 2019-02-25 NOTE — NURSING NOTE
"Chief Complaint   Patient presents with     RECHECK     Return patient, follow up       Vitals:    02/25/19 1010   BP: 136/86   Pulse: 83   SpO2: 97%   Weight: 103.2 kg (227 lb 8 oz)   Height: 1.803 m (5' 11\")       Body mass index is 31.73 kg/m .    Clementina Gregorio CMA      "

## 2019-02-25 NOTE — LETTER
2/25/2019       RE: Tatiana Smith  [address]  [place] [address]     Dear Colleague,    Thank you for referring your patient, Tatiana Smith, to the [facility] at [facility]. Please see a copy of my visit note below.    GI CLINIC VISIT    CC/REFERRING MD:  Bishop Yanez  REASON FOR CONSULTATION: follow-up    ASSESSMENT/PLAN:  1. Iron deficiency anemia   53 year old female with PMH of gastric bypass (Shaina en Y) in 1986, marginal ulcer on EGD 2/2016 with chronic iron deficiency anemia, who presents for follow-up for iron deficiency anemia.  Recent upper endoscopy and colonoscopy without source of overt bleeding.  Celiac markers and jejunal biopsy negative for celiac disease.  After last visit, patient was found to have decrease d iron to 12, decreased ferritin, and hemoglobin of 8.3 with normal B12 and folate.  She completed 2 iron infusions of Injectafer and notes improvement in overall fatigue, body aches, and globus sensation.  Will plan to recheck iron ferritin and CBC today.  Suspect that anemia likely due to history of bypass of the duodenum in the setting of history of gastric bypass.  History of hemorrhoid may also be a contributing to anemia which has recently been better controlled after starting MiraLAX daily.  Referral was placed for colorectal surgery if patient develops hemorrhoidal bleeding again, and she was encouraged to continue miralax on a daily basis.  She should continue to eat healthy iron rich diet she has been doing, and can discuss questions with our dietitian.  Would recommend regular monitoring of CBC and iron studies through primary care provider.  We discussed that if she again develops extreme fatigue, bright red blood per rectum, patient should seek more urgent care.   -- labs today   -- if you develop bleeding from your hemorrhoids, please see colorectal surgery   -- Miralax can be used to  treat constipation and works by increasing the amount of water in the stool. Start with 1 caps in 8 oz of water. You may increase or decrease dose as needed   -- can meet with our dietitian if you have further questions regarding iron rich foods     Colorectal cancer screening: Normal colonoscopy in 2018, repeat in 2028 or with change or worsening symptoms.    RTC 4-6 months (pending symptoms)     Thank you for this consultation. Patient discussed with Dr. Krueger. It was a pleasure to participate in the care of this patient; please contact us with any further questions.     Amarilys Waters PA-C  Division of Gastroenterology, Hepatology & Nutrition  [facility]      HPI  Summarized/taken from previous appointment:   Tatiana Smith is a 53 year old female with PMH of gastric bypass (Shaina en Y) in 1986, marginal ulcer on EGD 2/2016 with chronic iron deficiency anemia, who presents for further evaluation of iron deficiency anemia marginal ulcers.      Initially had melena in 2/2016 when in [place] (there frequently to help her daughter who is in nursing school and went through a divorce) with Hgb of 5.3. EGD at that time revealed clean based marginal ulcer. She was recommended to discontinue NSAIDs and take PPI and PO iron. In 7/2017, she had BRBPR so underwent colonoscopy in [place] which revealed internal/external hemorrhoids. Hgb was 6.6 at that time. She subsequently followed up in [facility] clinic 7/2018 with Hgb in the 8s and MCV in high 60s-70s, and then in [place] with internal medicine last month with Hgb 8.7 (was 9.0) one month prior. She continues on oral iron, taking 1-2 tabs per day. She has taken PPI off and on, most recently daily for one month ending 2 weeks ago. She uses NSAIDs, ibuprofen 2x/week for aches in her legs.      She denies any recent black stool. She notes BRBPR every 2 weeks which is small amount and sometimes only noted on toilet paper. Normally has one formed BM every other day. Notes  reduced exercise tolerance and easy fatiguing with anemia. No abdominal pain, N/V, dys/odynophagia. Weight stable. No fevers/chills.      No other sources of bleeding - perimenopausal, so one period every 2-3 months, not heavy. No nosebleeds. No easy brusing.     Interval History:  The patient underwent upper endoscopy 11/21/2018 notable for normal esophagus, stomach with evidence of previous surgical procedure with healthy anastomosis no ulcers, with normal jejunum. Biopsies for celiac sprue were normal. Colonoscopy with normal ileum and entire colon.     The patient notes she is overall feeling improved and states she no longer has significant body aches, issues with swallowing, or headaches.  Fatigue has improved significantly and she continues to have fatigue by the end of the day.  She describes her bowel pattern as McClain scale 5 bowel movement every 2 days since starting MiraLAX 1 capful a day.  Reports that constipation and straining has improved and has not had bleeding hemorrhoid since colonoscopy.  Reports that iron by mouth worsened constipation, and she felt better after 2 iron infusions.  She is no longer taking iron or pantoprazole.  Denies any other hematochezia or melena.    ROS:    No fevers or chills  + weight loss- intentional. Less sugar and less carbs . Looking for iron rich foods   No blurry vision, double vision or change in vision  No sore throat  No lymphadenopathy  No headache, paraesthesias, or weakness in a limb  No shortness of breath or wheezing  No chest pain or pressure  No arthralgias or myalgias  No rashes or skin changes  No odynophagia or dysphagia  No BRBPR, hematochezia, melena  No dysuria, frequency or urgency  No hot/cold intolerance or polyria  No anxiety or depression    PROBLEM LIST  Patient Active Problem List    Diagnosis Date Noted     Iron deficiency anemia due to chronic blood loss 11/05/2018     Priority: Medium       PERTINENT PAST MEDICAL HISTORY:  Past Medical  History:   Diagnosis Date     Anemia      H/O gastric bypass 1986     Ulcer, gastric, acute        PREVIOUS SURGERIES:  Past Surgical History:   Procedure Laterality Date     GI SURGERY         ALLERGIES:     Allergies   Allergen Reactions     Food      Chocolate       PERTINENT MEDICATIONS:    Current Outpatient Medications:      Ascorbic Acid (VITAMIN C PO), , Disp: , Rfl:      FERROUS GLUCONATE PO, Take 324 mg by mouth, Disp: , Rfl:      Multiple Vitamins-Minerals (MULTIVITAMIN ADULT PO), , Disp: , Rfl:      NONFORMULARY, Bio-dentical progesterone/testosterone cream, Disp: , Rfl:      pantoprazole (PROTONIX) 40 MG EC tablet, Take 1 tablet (40 mg) by mouth daily, Disp: 60 tablet, Rfl: 1     polyethylene glycol (MIRALAX/GLYCOLAX) powder, Take 1 capful by mouth daily, Disp: , Rfl:      ZOLMitriptan (ZOMIG PO), , Disp: , Rfl:     SOCIAL HISTORY:  No alochol, no smoking, not working   Social History     Socioeconomic History     Marital status:      Spouse name: Not on file     Number of children: Not on file     Years of education: Not on file     Highest education level: Not on file   Occupational History     Not on file   Social Needs     Financial resource strain: Not on file     Food insecurity:     Worry: Not on file     Inability: Not on file     Transportation needs:     Medical: Not on file     Non-medical: Not on file   Tobacco Use     Smoking status: Never Smoker     Smokeless tobacco: Never Used   Substance and Sexual Activity     Alcohol use: No     Drug use: No     Sexual activity: Not on file   Lifestyle     Physical activity:     Days per week: Not on file     Minutes per session: Not on file     Stress: Not on file   Relationships     Social connections:     Talks on phone: Not on file     Gets together: Not on file     Attends Christian service: Not on file     Active member of club or organization: Not on file     Attends meetings of clubs or organizations: Not on file     Relationship  "status: Not on file     Intimate partner violence:     Fear of current or ex partner: Not on file     Emotionally abused: Not on file     Physically abused: Not on file     Forced sexual activity: Not on file   Other Topics Concern     Not on file   Social History Narrative     Not on file       FAMILY HISTORY:  FH of CRC: none   FH of IBD: none    Mother with irritable bowel syndrome   No family history on file.    Past/family/social history reviewed and no changes    PHYSICAL EXAMINATION:  Constitutional: aaox3, cooperative, pleasant, not dyspneic/diaphoretic, no acute distress  Vitals reviewed: /86   Pulse 83   Ht 1.803 m (5' 11\")   Wt 103.2 kg (227 lb 8 oz)   SpO2 97%   BMI 31.73 kg/m     Wt:   Wt Readings from Last 2 Encounters:   02/25/19 103.2 kg (227 lb 8 oz)   11/05/18 115.7 kg (255 lb)      Eyes: Sclera anicteric/injected  Ears/nose/mouth/throat: Normal oropharynx without ulcers or exudate, mucus membranes moist, hearing intact  Neck: supple, thyroid normal size  CV: No edema  Respiratory: Unlabored breathing  Lymph: No submandibular, supraclavicular or lymphadenopathy  Abd:  Nondistended, no hepatosplenomegaly, nontender, no peritoneal signs  Skin: warm, perfused, no jaundice  Psych: Normal affect  MSK: Normal gait      PERTINENT STUDIES:    Documentation Only on 11/21/2018   Component Date Value Ref Range Status     Copath Report 11/21/2018    Final                    Value:Patient Name: GIDEON THORPE  MR#: [id number]  Specimen #: [id number]  Collected: 11/21/2018  Received: 11/23/2018  Reported: 11/27/2018 11:30  Ordering Phy(s): VENITA BERG  Additional Phy(s): [facility]: [facility]    For improved result formatting, select 'View Enhanced Report Format' under   Linked Documents section.    SPECIMEN(S):  Jejunum biopsy    FINAL DIAGNOSIS:  JEJUNUM BIOPSY:  - Jejunal mucosa with no significant histologic abnormality  - No evidence of celiac sprue or peptic jejunitis    I " "have personally reviewed all specimens and/or slides, including the   listed special stains, and used them  with my medical judgement to determine or confirm the final diagnosis.    Electronically signed out by:    Marissa Fabian M.D., PhD, [facility]    CLINICAL HISTORY:  Iron deficiency anemia in a patient with prior bariatric surgery, rule out   celiac sprue  GROSS:  The specimen is received in formalin with proper patient identification,   labeled \"jejunum bio                          psy\".  The  specimen consists of three tan-brown pieces of irregular soft tissue   ranging from 0.3-0.6 cm in greatest  dimension.  The specimen is entirely submitted in cassette A1. (Dictated   by: Guzman Lee 11/23/2018 08:43  AM)    MICROSCOPIC:  Microscopic examination was performed.    CPT Codes:  A: 61688-LE1    TESTING LAB LOCATION:  [facility], [address]   [address]  [phone]    COLLECTION SITE:  Client: [facility]  Location: [facility] (B)    Resident  [id number]         Again, thank you for allowing me to participate in the care of your patient.      Sincerely,    Amarilys Waters PA-C      "

## 2019-06-03 ENCOUNTER — TRANSFERRED RECORDS (OUTPATIENT)
Dept: HEALTH INFORMATION MANAGEMENT | Facility: CLINIC | Age: 54
End: 2019-06-03

## 2019-06-03 ENCOUNTER — MEDICAL CORRESPONDENCE (OUTPATIENT)
Dept: HEALTH INFORMATION MANAGEMENT | Facility: CLINIC | Age: 54
End: 2019-06-03

## 2019-06-04 ENCOUNTER — MEDICAL CORRESPONDENCE (OUTPATIENT)
Dept: HEALTH INFORMATION MANAGEMENT | Facility: CLINIC | Age: 54
End: 2019-06-04

## 2019-06-18 ENCOUNTER — TELEPHONE (OUTPATIENT)
Dept: GASTROENTEROLOGY | Facility: CLINIC | Age: 54
End: 2019-06-18

## 2020-02-23 ENCOUNTER — HEALTH MAINTENANCE LETTER (OUTPATIENT)
Age: 55
End: 2020-02-23

## 2020-04-16 ENCOUNTER — MEDICAL CORRESPONDENCE (OUTPATIENT)
Dept: HEALTH INFORMATION MANAGEMENT | Facility: CLINIC | Age: 55
End: 2020-04-16

## 2020-05-27 ENCOUNTER — ANCILLARY PROCEDURE (OUTPATIENT)
Dept: MAMMOGRAPHY | Facility: CLINIC | Age: 55
End: 2020-05-27
Attending: FAMILY MEDICINE

## 2020-05-27 DIAGNOSIS — Z12.31 VISIT FOR SCREENING MAMMOGRAM: ICD-10-CM

## 2020-07-25 ENCOUNTER — TELEPHONE ENCOUNTER (OUTPATIENT)
Dept: URBAN - METROPOLITAN AREA CLINIC 13 | Facility: CLINIC | Age: 55
End: 2020-07-25

## 2020-07-26 ENCOUNTER — TELEPHONE ENCOUNTER (OUTPATIENT)
Dept: URBAN - METROPOLITAN AREA CLINIC 13 | Facility: CLINIC | Age: 55
End: 2020-07-26

## 2020-12-13 ENCOUNTER — HEALTH MAINTENANCE LETTER (OUTPATIENT)
Age: 55
End: 2020-12-13

## 2021-04-17 ENCOUNTER — HEALTH MAINTENANCE LETTER (OUTPATIENT)
Age: 56
End: 2021-04-17

## 2021-09-26 ENCOUNTER — HEALTH MAINTENANCE LETTER (OUTPATIENT)
Age: 56
End: 2021-09-26

## 2022-05-08 ENCOUNTER — HEALTH MAINTENANCE LETTER (OUTPATIENT)
Age: 57
End: 2022-05-08

## 2022-08-22 ENCOUNTER — LAB REQUISITION (OUTPATIENT)
Dept: LAB | Facility: CLINIC | Age: 57
End: 2022-08-22

## 2022-08-22 DIAGNOSIS — I10 ESSENTIAL (PRIMARY) HYPERTENSION: ICD-10-CM

## 2022-08-22 DIAGNOSIS — E66.9 OBESITY, UNSPECIFIED: ICD-10-CM

## 2022-08-22 DIAGNOSIS — R53.83 OTHER FATIGUE: ICD-10-CM

## 2022-08-22 LAB
ALBUMIN SERPL BCG-MCNC: 4.2 G/DL (ref 3.5–5.2)
ALP SERPL-CCNC: 90 U/L (ref 35–104)
ALT SERPL W P-5'-P-CCNC: 23 U/L (ref 10–35)
ANION GAP SERPL CALCULATED.3IONS-SCNC: 11 MMOL/L (ref 7–15)
AST SERPL W P-5'-P-CCNC: 25 U/L (ref 10–35)
BILIRUB SERPL-MCNC: 0.3 MG/DL
BUN SERPL-MCNC: 18.6 MG/DL (ref 6–20)
CALCIUM SERPL-MCNC: 9.3 MG/DL (ref 8.6–10)
CHLORIDE SERPL-SCNC: 105 MMOL/L (ref 98–107)
CHOLEST SERPL-MCNC: 151 MG/DL
CREAT SERPL-MCNC: 1.07 MG/DL (ref 0.51–0.95)
DEPRECATED HCO3 PLAS-SCNC: 22 MMOL/L (ref 22–29)
GFR SERPL CREATININE-BSD FRML MDRD: 60 ML/MIN/1.73M2
GLUCOSE SERPL-MCNC: 90 MG/DL (ref 70–99)
HDLC SERPL-MCNC: 60 MG/DL
LDLC SERPL CALC-MCNC: 80 MG/DL
NONHDLC SERPL-MCNC: 91 MG/DL
POTASSIUM SERPL-SCNC: 4.2 MMOL/L (ref 3.4–5.3)
PROT SERPL-MCNC: 6.4 G/DL (ref 6.4–8.3)
SODIUM SERPL-SCNC: 138 MMOL/L (ref 136–145)
TRIGL SERPL-MCNC: 54 MG/DL
TSH SERPL DL<=0.005 MIU/L-ACNC: 2.42 UIU/ML (ref 0.3–4.2)

## 2022-08-22 PROCEDURE — 84443 ASSAY THYROID STIM HORMONE: CPT | Performed by: FAMILY MEDICINE

## 2022-08-22 PROCEDURE — 80053 COMPREHEN METABOLIC PANEL: CPT | Performed by: FAMILY MEDICINE

## 2022-08-22 PROCEDURE — 80061 LIPID PANEL: CPT | Performed by: FAMILY MEDICINE

## 2022-08-28 ENCOUNTER — HEALTH MAINTENANCE LETTER (OUTPATIENT)
Age: 57
End: 2022-08-28

## 2022-11-23 ENCOUNTER — LAB REQUISITION (OUTPATIENT)
Dept: LAB | Facility: CLINIC | Age: 57
End: 2022-11-23
Payer: MEDICAID

## 2022-11-23 DIAGNOSIS — D64.9 ANEMIA, UNSPECIFIED: ICD-10-CM

## 2022-11-23 PROCEDURE — 82728 ASSAY OF FERRITIN: CPT | Mod: ORL | Performed by: FAMILY MEDICINE

## 2022-11-24 LAB — FERRITIN SERPL-MCNC: 6 NG/ML (ref 11–328)

## 2023-01-08 ENCOUNTER — HEALTH MAINTENANCE LETTER (OUTPATIENT)
Age: 58
End: 2023-01-08

## 2023-01-09 ENCOUNTER — ANCILLARY ORDERS (OUTPATIENT)
Dept: MAMMOGRAPHY | Facility: CLINIC | Age: 58
End: 2023-01-09

## 2023-01-09 DIAGNOSIS — Z12.31 ENCOUNTER FOR SCREENING MAMMOGRAM FOR MALIGNANT NEOPLASM OF BREAST: ICD-10-CM

## 2023-03-13 ENCOUNTER — ANCILLARY PROCEDURE (OUTPATIENT)
Dept: MAMMOGRAPHY | Facility: CLINIC | Age: 58
End: 2023-03-13
Payer: COMMERCIAL

## 2023-03-13 DIAGNOSIS — Z12.31 ENCOUNTER FOR SCREENING MAMMOGRAM FOR MALIGNANT NEOPLASM OF BREAST: ICD-10-CM

## 2023-03-13 PROCEDURE — 77067 SCR MAMMO BI INCL CAD: CPT | Mod: TC | Performed by: RADIOLOGY

## 2023-04-10 ENCOUNTER — LAB REQUISITION (OUTPATIENT)
Dept: LAB | Facility: CLINIC | Age: 58
End: 2023-04-10
Payer: COMMERCIAL

## 2023-04-10 DIAGNOSIS — I10 ESSENTIAL (PRIMARY) HYPERTENSION: ICD-10-CM

## 2023-04-10 LAB
ALBUMIN SERPL BCG-MCNC: 4.2 G/DL (ref 3.5–5.2)
ALP SERPL-CCNC: 102 U/L (ref 35–104)
ALT SERPL W P-5'-P-CCNC: 18 U/L (ref 10–35)
ANION GAP SERPL CALCULATED.3IONS-SCNC: 14 MMOL/L (ref 7–15)
AST SERPL W P-5'-P-CCNC: 20 U/L (ref 10–35)
BILIRUB SERPL-MCNC: 0.2 MG/DL
BUN SERPL-MCNC: 25.5 MG/DL (ref 6–20)
CALCIUM SERPL-MCNC: 9.7 MG/DL (ref 8.6–10)
CHLORIDE SERPL-SCNC: 106 MMOL/L (ref 98–107)
CREAT SERPL-MCNC: 0.97 MG/DL (ref 0.51–0.95)
DEPRECATED HCO3 PLAS-SCNC: 21 MMOL/L (ref 22–29)
GFR SERPL CREATININE-BSD FRML MDRD: 68 ML/MIN/1.73M2
GLUCOSE SERPL-MCNC: 93 MG/DL (ref 70–99)
POTASSIUM SERPL-SCNC: 4.1 MMOL/L (ref 3.4–5.3)
PROT SERPL-MCNC: 6.9 G/DL (ref 6.4–8.3)
SODIUM SERPL-SCNC: 141 MMOL/L (ref 136–145)

## 2023-04-10 PROCEDURE — 80053 COMPREHEN METABOLIC PANEL: CPT | Performed by: FAMILY MEDICINE

## 2023-06-02 ENCOUNTER — HEALTH MAINTENANCE LETTER (OUTPATIENT)
Age: 58
End: 2023-06-02

## 2024-06-30 ENCOUNTER — HEALTH MAINTENANCE LETTER (OUTPATIENT)
Age: 59
End: 2024-06-30

## 2025-06-01 ENCOUNTER — HEALTH MAINTENANCE LETTER (OUTPATIENT)
Age: 60
End: 2025-06-01

## 2025-07-13 ENCOUNTER — HEALTH MAINTENANCE LETTER (OUTPATIENT)
Age: 60
End: 2025-07-13